# Patient Record
Sex: FEMALE | Employment: OTHER
[De-identification: names, ages, dates, MRNs, and addresses within clinical notes are randomized per-mention and may not be internally consistent; named-entity substitution may affect disease eponyms.]

---

## 2020-12-16 DIAGNOSIS — K02.9 CARIES: Primary | ICD-10-CM

## 2023-05-15 ENCOUNTER — TRANSCRIBE ORDERS (OUTPATIENT)
Dept: OTHER | Age: 68
End: 2023-05-15

## 2023-05-15 DIAGNOSIS — N32.89 OTHER SPECIFIED DISORDERS OF BLADDER: Primary | ICD-10-CM

## 2023-06-01 ENCOUNTER — OFFICE VISIT (OUTPATIENT)
Dept: UROLOGY | Facility: CLINIC | Age: 68
End: 2023-06-01
Payer: COMMERCIAL

## 2023-06-01 VITALS
HEART RATE: 87 BPM | WEIGHT: 177 LBS | BODY MASS INDEX: 27.78 KG/M2 | DIASTOLIC BLOOD PRESSURE: 85 MMHG | HEIGHT: 67 IN | SYSTOLIC BLOOD PRESSURE: 115 MMHG | OXYGEN SATURATION: 96 %

## 2023-06-01 DIAGNOSIS — C67.8 MALIGNANT NEOPLASM OF OVERLAPPING SITES OF BLADDER (H): Primary | ICD-10-CM

## 2023-06-01 DIAGNOSIS — N32.89 OTHER SPECIFIED DISORDERS OF BLADDER: ICD-10-CM

## 2023-06-01 DIAGNOSIS — N13.30 HYDRONEPHROSIS OF LEFT KIDNEY: ICD-10-CM

## 2023-06-01 PROCEDURE — 99204 OFFICE O/P NEW MOD 45 MIN: CPT | Performed by: UROLOGY

## 2023-06-01 RX ORDER — GABAPENTIN 600 MG/1
300 TABLET ORAL 3 TIMES DAILY
COMMUNITY

## 2023-06-01 RX ORDER — HYDROXYZINE PAMOATE 25 MG/1
CAPSULE ORAL
COMMUNITY

## 2023-06-01 RX ORDER — SIMVASTATIN 20 MG
TABLET ORAL
COMMUNITY

## 2023-06-01 RX ORDER — HYDROCODONE BITARTRATE AND ACETAMINOPHEN 5; 325 MG/1; MG/1
1 TABLET ORAL EVERY 6 HOURS PRN
COMMUNITY
End: 2023-07-10

## 2023-06-01 RX ORDER — HYDROCODONE BITARTRATE AND ACETAMINOPHEN 5; 325 MG/1; MG/1
1 TABLET ORAL EVERY 6 HOURS PRN
Qty: 5 TABLET | Refills: 0 | Status: SHIPPED | OUTPATIENT
Start: 2023-06-01 | End: 2023-06-26

## 2023-06-01 ASSESSMENT — PAIN SCALES - GENERAL: PAINLEVEL: MODERATE PAIN (4)

## 2023-06-01 NOTE — LETTER
6/1/2023       RE: Abiel Ruiz  [address]  [facility] [address]     Dear Colleague,    Thank you for referring your patient, Abiel Ruiz, to the [facility] [place] at [facility]. Please see a copy of my visit note below.          Chief Complaint:   Bladder Cancer         Consult or Referral:     Abiel Ruiz is a 68 year old female seen at the request of Dr. Suazo.         History of Present Illness:    Abeil Ruiz is a very pleasant 68 year old female who presents with a history of muscle-invasive bladder cancer. She recently experienced gross hematuria and was found on workup to have left-sided bladder mass and left hydronephrosis. TURBT completed by Dr. Suazo was notable for invasive high grade urothelial carcinoma with muscularis propria invasion. She presents for further evaluation. Her bladder tumor was noted to be at the trigone, involving the left ureteral orifice, which was not identified during procedure.  She has done well since procedure with less hematuria, but continues to have left flank pain, likely related to obstruction. She was advised to have left PNT placed, but has declined to this point.     Creat 1.3     CT abd/pelvis 5/2/2023  IMPRESSION:   1. High-grade obstruction of the distal left ureter with limited visualization because of a right total hip arthroplasty and associated streak artifact. Questionable fat density in the distal left ureter. No definite renal, ureteral or bladder calculi. I would recommend urologic consultation. Cystoscopy may be appropriate.          Past Medical History:     Past Medical History:   Diagnosis Date    Gout     Spider veins    HTN         Past Surgical History:     Past Surgical History:   Procedure Laterality Date    BLADDER SURGERY N/A 05/10/2023    removal mass of urinary bladder   Hip replacement  Knee replacement  Remote  history of pneumothorax         Medications     Current Outpatient Medications   Medication    diphenhydrAMINE-acetaminophen (TYLENOL PM)  MG tablet    gabapentin (NEURONTIN) 600 MG tablet    HYDROcodone-acetaminophen (NORCO) 5-325 MG tablet    hydrOXYzine (VISTARIL) 25 MG capsule    simvastatin (ZOCOR) 20 MG tablet     No current facility-administered medications for this visit.          Family History:   History reviewed. No pertinent family history.     No known family history of  malignancy.         Social History:     Social History     Socioeconomic History    Marital status: Single     Spouse name: Not on file    Number of children: Not on file    Years of education: Not on file    Highest education level: Not on file   Occupational History    Not on file   Tobacco Use    Smoking status: Not on file    Smokeless tobacco: Not on file   Vaping Use    Vaping status: Not on file   Substance and Sexual Activity    Alcohol use: Not on file    Drug use: Not on file    Sexual activity: Not on file   Other Topics Concern    Not on file   Social History Narrative    Not on file     Social Determinants of Health     Financial Resource Strain: Not on file   Food Insecurity: Not on file   Transportation Needs: Not on file   Physical Activity: Not on file   Stress: Not on file   Social Connections: Not on file   Intimate Partner Violence: Not on file   Housing Stability: Not on file            Allergies:   Patient has no known allergies.         Review of Systems:  From intake questionnaire     Skin: negative  Eyes: negative  Ears/Nose/Throat: negative  Respiratory: No shortness of breath, dyspnea on exertion, cough, or hemoptysis  Cardiovascular: No chest pain or palpitations  Gastrointestinal: negative; no nausea/vomiting, constipation or diarrhea  Genitourinary: as per HPI  Musculoskeletal: negative  Neurologic: negative  Psychiatric: negative  Hematologic/Lymphatic/Immunologic: negative  Endocrine: negative      "    Physical Exam:     Patient is a 68 year old  female   Vitals: Blood pressure 115/85, pulse 87, height 1.702 m (5' 7\"), weight 80.3 kg (177 lb), SpO2 96 %.  Constitutional: Body mass index is 27.72 kg/m .  Alert, no acute distress, oriented, conversant  Eyes: no scleral icterus; extraocular muscles intact, moist conjunctivae  Respiratory: no respiratory distress, or pursed lip breathing  Cardiovascular: pulses strong and intact; no obvious jugular venous distension present  Gastrointestinal: soft, nontender, no organomegaly or masses,   Musculoskeletal: extremities normal, no peripheral edema  Skin: no suspicious lesions or rashes  Neuro: Alert, oriented, speech and mentation normal  Psych: affect and mood normal, alert and oriented to person, place and time      Imaging:    The following imaging exams were independently viewed and interpreted by me and discussed with patient:  CT with left hydronephrosis and bladder mass      Outside and Past Medical records:    Review of prior external note(s) from - Outside records from [facility]. Care Everywhere from [facility]  Review of the result(s) of each unique test - pathology, CT, creat         Assessment and Plan:     Assessment: 68 year old female with history of new diagnosis of muscle-invasive bladder cancer. She also has left flank pain and left hydronephrosis from obstructing tumor. We had a long discussion about muscleinvasive bladder cancer.  We discussed the fact that once urothelial cancer invades the bladder muscle layer it carries increased potential to invade the blood vessels and lymphatic channels and spread throughout the body.  Once urothelial cancer spreads to distant organs, the opportunity for complete response to treatment is rare. Thus, optimal cancer control is best achieved with aggressive surgical intervention.     We discussed radical cystectomy and the potential complications associated with it, including a 2-3% perioperative mortality " risk, and the risk of complications is about 60%-70%. Potential complications include, but are not limited to MI, stroke, DVT/PE, bleeding, infection, injury to surrounding structures, urine leak, and various urinary diversion-related complications. We also discussed that about 20-30% of patients will need to go to a rehab facility after discharge from the hospital.  Though most patients get through this operation, there is often a significant delay in regaining appetite and sometimes takes several months to feel back to normal.    We also briefly discussed the use of radiation combined with chemotherapy and radical TURBT, or tri-modal therapy. While this is an option in certain circumstances, it is less commonly used due to higher risk of recurrence and limitations on the clinical circumstances where this is an appropriate treatment choice.    We discussed the various types of urinary diversion including ileal conduit, [place] Pouch and ileal neobladder.  The various pros and cons of each type of urinary diversion was discussed and all questions were answered.  Prospective quality of life data is lacking that compares the various diversion types, but retrospective data suggest the differences are small by diversion type.    We discussed that while a neobladder allows voiding per urethra after surgery, it is a more complicated procedure and requires more significant maintenance following surgery. We discussed the possible functional limitations of this diversion, including risk of urinary incontinence, particularly at night-time, and the risk of urinary retention. We discussed that some patients need to perform intermittent catheterization indefinitely and may require bladder irrigations to prevent mucous and infections.    We discussed the use of neoadjuvant chemotherapy in the setting of muscle invasive bladder cancer and that the combined analyses of these studies indicate an absolute survival advantage. In this  particular circumstance, we discussed that the gold standard approach is neoadjuvant chemotherapy followed by radical cystectomy. A medical oncology referral was placed today to discuss this further.    The patient had many questions and we went over these carefully.     At this point, she is undecided about how she would like to proceed. She is hesitant about the idea of cystectomy. Furthermore, she is reticent to consider PNT. At this point I recommend evaluation with medical oncology to discuss the notion of neoadjuvant chemotherapy. It is also reasonable to discuss chemoradiation, however given location of tumor and obstruction of left kidney, this will likely create long-standing renal obstruction. For now, I have also recommended PNT to decompress left kidney, but she wants to meet with oncology first.    Plan:  Norco for ongoing bladder and flank pain  Recommend left PNT  Medical oncology referral    Orders  Orders Placed This Encounter   Procedures    Adult Oncology/Hematology  Referral     50 total minutes spent on the date of the encounter including direct interaction with the patient, performing chart review, documentation and further activities as noted above.    Reginald Rosales MD  Urology  [facility]

## 2023-06-01 NOTE — PROGRESS NOTES
Chief Complaint:   Bladder Cancer         Consult or Referral:     Abiel Ruiz is a 68 year old female seen at the request of Dr. Suazo.         History of Present Illness:    Abiel Ruiz is a very pleasant 68 year old female who presents with a history of muscle-invasive bladder cancer. She recently experienced gross hematuria and was found on workup to have left-sided bladder mass and left hydronephrosis. TURBT completed by Dr. Suazo was notable for invasive high grade urothelial carcinoma with muscularis propria invasion. She presents for further evaluation. Her bladder tumor was noted to be at the trigone, involving the left ureteral orifice, which was not identified during procedure.  She has done well since procedure with less hematuria, but continues to have left flank pain, likely related to obstruction. She was advised to have left PNT placed, but has declined to this point.     Creat 1.3     CT abd/pelvis 5/2/2023  IMPRESSION:   1. High-grade obstruction of the distal left ureter with limited visualization because of a right total hip arthroplasty and associated streak artifact. Questionable fat density in the distal left ureter. No definite renal, ureteral or bladder calculi. I would recommend urologic consultation. Cystoscopy may be appropriate.          Past Medical History:     Past Medical History:   Diagnosis Date     Gout      Spider veins    HTN         Past Surgical History:     Past Surgical History:   Procedure Laterality Date     BLADDER SURGERY N/A 05/10/2023    removal mass of urinary bladder   Hip replacement  Knee replacement  Remote history of pneumothorax         Medications     Current Outpatient Medications   Medication     diphenhydrAMINE-acetaminophen (TYLENOL PM)  MG tablet     gabapentin (NEURONTIN) 600 MG tablet     HYDROcodone-acetaminophen (NORCO) 5-325 MG tablet     hydrOXYzine (VISTARIL) 25 MG capsule     simvastatin (ZOCOR) 20 MG  "tablet     No current facility-administered medications for this visit.          Family History:   History reviewed. No pertinent family history.     No known family history of  malignancy.         Social History:     Social History     Socioeconomic History     Marital status: Single     Spouse name: Not on file     Number of children: Not on file     Years of education: Not on file     Highest education level: Not on file   Occupational History     Not on file   Tobacco Use     Smoking status: Not on file     Smokeless tobacco: Not on file   Vaping Use     Vaping status: Not on file   Substance and Sexual Activity     Alcohol use: Not on file     Drug use: Not on file     Sexual activity: Not on file   Other Topics Concern     Not on file   Social History Narrative     Not on file     Social Determinants of Health     Financial Resource Strain: Not on file   Food Insecurity: Not on file   Transportation Needs: Not on file   Physical Activity: Not on file   Stress: Not on file   Social Connections: Not on file   Intimate Partner Violence: Not on file   Housing Stability: Not on file            Allergies:   Patient has no known allergies.         Review of Systems:  From intake questionnaire     Skin: negative  Eyes: negative  Ears/Nose/Throat: negative  Respiratory: No shortness of breath, dyspnea on exertion, cough, or hemoptysis  Cardiovascular: No chest pain or palpitations  Gastrointestinal: negative; no nausea/vomiting, constipation or diarrhea  Genitourinary: as per HPI  Musculoskeletal: negative  Neurologic: negative  Psychiatric: negative  Hematologic/Lymphatic/Immunologic: negative  Endocrine: negative         Physical Exam:     Patient is a 68 year old  female   Vitals: Blood pressure 115/85, pulse 87, height 1.702 m (5' 7\"), weight 80.3 kg (177 lb), SpO2 96 %.  Constitutional: Body mass index is 27.72 kg/m .  Alert, no acute distress, oriented, conversant  Eyes: no scleral icterus; extraocular muscles " intact, moist conjunctivae  Respiratory: no respiratory distress, or pursed lip breathing  Cardiovascular: pulses strong and intact; no obvious jugular venous distension present  Gastrointestinal: soft, nontender, no organomegaly or masses,   Musculoskeletal: extremities normal, no peripheral edema  Skin: no suspicious lesions or rashes  Neuro: Alert, oriented, speech and mentation normal  Psych: affect and mood normal, alert and oriented to person, place and time      Imaging:    The following imaging exams were independently viewed and interpreted by me and discussed with patient:  CT with left hydronephrosis and bladder mass      Outside and Past Medical records:    Review of prior external note(s) from - Outside records from [facility]. Care Everywhere from [facility]  Review of the result(s) of each unique test - pathology, CT, creat         Assessment and Plan:     Assessment: 68 year old female with history of new diagnosis of muscle-invasive bladder cancer. She also has left flank pain and left hydronephrosis from obstructing tumor. We had a long discussion about muscleinvasive bladder cancer.  We discussed the fact that once urothelial cancer invades the bladder muscle layer it carries increased potential to invade the blood vessels and lymphatic channels and spread throughout the body.  Once urothelial cancer spreads to distant organs, the opportunity for complete response to treatment is rare. Thus, optimal cancer control is best achieved with aggressive surgical intervention.     We discussed radical cystectomy and the potential complications associated with it, including a 2-3% perioperative mortality risk, and the risk of complications is about 60%-70%. Potential complications include, but are not limited to MI, stroke, DVT/PE, bleeding, infection, injury to surrounding structures, urine leak, and various urinary diversion-related complications. We also discussed that about 20-30% of patients will  need to go to a rehab facility after discharge from the hospital.  Though most patients get through this operation, there is often a significant delay in regaining appetite and sometimes takes several months to feel back to normal.    We also briefly discussed the use of radiation combined with chemotherapy and radical TURBT, or tri-modal therapy. While this is an option in certain circumstances, it is less commonly used due to higher risk of recurrence and limitations on the clinical circumstances where this is an appropriate treatment choice.    We discussed the various types of urinary diversion including ileal conduit, [place] Pouch and ileal neobladder.  The various pros and cons of each type of urinary diversion was discussed and all questions were answered.  Prospective quality of life data is lacking that compares the various diversion types, but retrospective data suggest the differences are small by diversion type.    We discussed that while a neobladder allows voiding per urethra after surgery, it is a more complicated procedure and requires more significant maintenance following surgery. We discussed the possible functional limitations of this diversion, including risk of urinary incontinence, particularly at night-time, and the risk of urinary retention. We discussed that some patients need to perform intermittent catheterization indefinitely and may require bladder irrigations to prevent mucous and infections.    We discussed the use of neoadjuvant chemotherapy in the setting of muscle invasive bladder cancer and that the combined analyses of these studies indicate an absolute survival advantage. In this particular circumstance, we discussed that the gold standard approach is neoadjuvant chemotherapy followed by radical cystectomy. A medical oncology referral was placed today to discuss this further.    The patient had many questions and we went over these carefully.     At this point, she is undecided  about how she would like to proceed. She is hesitant about the idea of cystectomy. Furthermore, she is reticent to consider PNT. At this point I recommend evaluation with medical oncology to discuss the notion of neoadjuvant chemotherapy. It is also reasonable to discuss chemoradiation, however given location of tumor and obstruction of left kidney, this will likely create long-standing renal obstruction. For now, I have also recommended PNT to decompress left kidney, but she wants to meet with oncology first.    Plan:  Norco for ongoing bladder and flank pain  Recommend left PNT  Medical oncology referral    Orders  Orders Placed This Encounter   Procedures     Adult Oncology/Hematology  Referral     50 total minutes spent on the date of the encounter including direct interaction with the patient, performing chart review, documentation and further activities as noted above.    Reginald Rosales MD  Urology  [facility]

## 2023-06-02 ENCOUNTER — PATIENT OUTREACH (OUTPATIENT)
Dept: ONCOLOGY | Facility: CLINIC | Age: 68
End: 2023-06-02
Payer: COMMERCIAL

## 2023-06-02 NOTE — PROGRESS NOTES
[facility]: Cancer Care                                                                   Hem/Onc  Referral reviewed     Referred By 06/01/2023 12:13 PM   Routine: Next available opening    Referred To   Reginald Rosales MD    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 Left voicemail introducing my role as nurse navigator with [facility] and that we have recd the referral to oncology from her urologist.Explained to pt that we are working on location date/time options for consult and she will receive a call from our scheduling intake team in the next 1-2 business day to review date/time/location options, provided our callback number below for interval questions.     PLAN                                                      Medical Oncology consult       See records team's Pre-Visit encounter documentation for additional records retrieval information.    Kaylen Landeros, RN, BSN, OCN  Hematology/Oncology New Patient Nurse Navigator    [facility]  [phone]  [phone]

## 2023-06-06 NOTE — TELEPHONE ENCOUNTER
RECORDS STATUS - ALL OTHER DIAGNOSIS    Malignant neoplasm of overlapping sites of bladder   RECORDS RECEIVED FROM: [facility]   DATE RECEIVED: 6/7/2023    Action    Action Taken 6/6/2023 5:15pm SALVADOR    I called [facility]'s IMG Dept Ph: [phone]- [phone]. I faxed over a request for scans.     I faxed over a request for path slides to [facility]     6/9/23 12:11 PM- Slides received from [facility] (2 Slides). Sent to fifth floor pathology to be reviewed.       NOTES STATUS DETAILS   OFFICE NOTE from referring provider     OFFICE NOTE from medical oncologist     DISCHARGE SUMMARY from hospital     DISCHARGE REPORT from the ER     OPERATIVE REPORT     MEDICATION LIST     CLINICAL TRIAL TREATMENTS TO DATE     LABS     PATHOLOGY REPORTS Requested- [facility]   [employer] Tracking Number: [id number] 5/10/2023   [facility]   Case: [id number]                                   Bladder, tumor, TUR - Invasive high grade urothelial carcinoma with muscularis propria invasion   ANYTHING RELATED TO DIAGNOSIS     GENONOMIC TESTING     TYPE:     IMAGING (NEED IMAGES & REPORT)     CT SCANS PACS 5/2/23, 6/9/22, 6/9/21, 5/11/21: [facility]   MRI     MAMMO     ULTRASOUND PACS 7/27/16: [facility]   PET

## 2023-06-07 ENCOUNTER — ANCILLARY PROCEDURE (OUTPATIENT)
Dept: ULTRASOUND IMAGING | Facility: CLINIC | Age: 68
End: 2023-06-07
Attending: STUDENT IN AN ORGANIZED HEALTH CARE EDUCATION/TRAINING PROGRAM
Payer: COMMERCIAL

## 2023-06-07 ENCOUNTER — PATIENT OUTREACH (OUTPATIENT)
Dept: ONCOLOGY | Facility: CLINIC | Age: 68
End: 2023-06-07

## 2023-06-07 ENCOUNTER — ONCOLOGY VISIT (OUTPATIENT)
Dept: ONCOLOGY | Facility: CLINIC | Age: 68
End: 2023-06-07
Attending: UROLOGY
Payer: COMMERCIAL

## 2023-06-07 ENCOUNTER — PRE VISIT (OUTPATIENT)
Dept: ONCOLOGY | Facility: CLINIC | Age: 68
End: 2023-06-07
Payer: COMMERCIAL

## 2023-06-07 VITALS
HEIGHT: 67 IN | SYSTOLIC BLOOD PRESSURE: 127 MMHG | RESPIRATION RATE: 16 BRPM | OXYGEN SATURATION: 100 % | HEART RATE: 77 BPM | BODY MASS INDEX: 27.83 KG/M2 | DIASTOLIC BLOOD PRESSURE: 75 MMHG | WEIGHT: 177.3 LBS | TEMPERATURE: 97.9 F

## 2023-06-07 DIAGNOSIS — C67.8 MALIGNANT NEOPLASM OF OVERLAPPING SITES OF BLADDER (H): Primary | ICD-10-CM

## 2023-06-07 DIAGNOSIS — C67.8 MALIGNANT NEOPLASM OF OVERLAPPING SITES OF BLADDER (H): ICD-10-CM

## 2023-06-07 LAB
ALBUMIN SERPL BCG-MCNC: 4.1 G/DL (ref 3.5–5.2)
ALP SERPL-CCNC: 71 U/L (ref 35–104)
ALT SERPL W P-5'-P-CCNC: 8 U/L (ref 10–35)
ANION GAP SERPL CALCULATED.3IONS-SCNC: 10 MMOL/L (ref 7–15)
AST SERPL W P-5'-P-CCNC: 19 U/L (ref 10–35)
BASOPHILS # BLD AUTO: 0.1 10E3/UL (ref 0–0.2)
BASOPHILS NFR BLD AUTO: 1 %
BILIRUB SERPL-MCNC: 0.4 MG/DL
BUN SERPL-MCNC: 7.9 MG/DL (ref 8–23)
CALCIUM SERPL-MCNC: 9.8 MG/DL (ref 8.8–10.2)
CHLORIDE SERPL-SCNC: 104 MMOL/L (ref 98–107)
CREAT SERPL-MCNC: 1.15 MG/DL (ref 0.51–0.95)
DEPRECATED HCO3 PLAS-SCNC: 25 MMOL/L (ref 22–29)
EOSINOPHIL # BLD AUTO: 0.1 10E3/UL (ref 0–0.7)
EOSINOPHIL NFR BLD AUTO: 2 %
ERYTHROCYTE [DISTWIDTH] IN BLOOD BY AUTOMATED COUNT: 12.9 % (ref 10–15)
GFR SERPL CREATININE-BSD FRML MDRD: 51 ML/MIN/1.73M2
GLUCOSE SERPL-MCNC: 97 MG/DL (ref 70–99)
HCT VFR BLD AUTO: 37.9 % (ref 35–47)
HGB BLD-MCNC: 12.4 G/DL (ref 11.7–15.7)
IMM GRANULOCYTES # BLD: 0 10E3/UL
IMM GRANULOCYTES NFR BLD: 0 %
LYMPHOCYTES # BLD AUTO: 4.2 10E3/UL (ref 0.8–5.3)
LYMPHOCYTES NFR BLD AUTO: 48 %
MAGNESIUM SERPL-MCNC: 1.9 MG/DL (ref 1.7–2.3)
MCH RBC QN AUTO: 30.8 PG (ref 26.5–33)
MCHC RBC AUTO-ENTMCNC: 32.7 G/DL (ref 31.5–36.5)
MCV RBC AUTO: 94 FL (ref 78–100)
MONOCYTES # BLD AUTO: 0.6 10E3/UL (ref 0–1.3)
MONOCYTES NFR BLD AUTO: 6 %
NEUTROPHILS # BLD AUTO: 3.8 10E3/UL (ref 1.6–8.3)
NEUTROPHILS NFR BLD AUTO: 43 %
NRBC # BLD AUTO: 0 10E3/UL
NRBC BLD AUTO-RTO: 0 /100
PHOSPHATE SERPL-MCNC: 3.2 MG/DL (ref 2.5–4.5)
PLATELET # BLD AUTO: 323 10E3/UL (ref 150–450)
POTASSIUM SERPL-SCNC: 4 MMOL/L (ref 3.4–5.3)
PROT SERPL-MCNC: 7.8 G/DL (ref 6.4–8.3)
RBC # BLD AUTO: 4.02 10E6/UL (ref 3.8–5.2)
SODIUM SERPL-SCNC: 139 MMOL/L (ref 136–145)
URATE SERPL-MCNC: 7.9 MG/DL (ref 2.4–5.7)
WBC # BLD AUTO: 8.8 10E3/UL (ref 4–11)

## 2023-06-07 PROCEDURE — 36415 COLL VENOUS BLD VENIPUNCTURE: CPT | Performed by: STUDENT IN AN ORGANIZED HEALTH CARE EDUCATION/TRAINING PROGRAM

## 2023-06-07 PROCEDURE — 83735 ASSAY OF MAGNESIUM: CPT | Performed by: STUDENT IN AN ORGANIZED HEALTH CARE EDUCATION/TRAINING PROGRAM

## 2023-06-07 PROCEDURE — 82374 ASSAY BLOOD CARBON DIOXIDE: CPT | Performed by: STUDENT IN AN ORGANIZED HEALTH CARE EDUCATION/TRAINING PROGRAM

## 2023-06-07 PROCEDURE — 84100 ASSAY OF PHOSPHORUS: CPT | Performed by: STUDENT IN AN ORGANIZED HEALTH CARE EDUCATION/TRAINING PROGRAM

## 2023-06-07 PROCEDURE — 84550 ASSAY OF BLOOD/URIC ACID: CPT | Performed by: STUDENT IN AN ORGANIZED HEALTH CARE EDUCATION/TRAINING PROGRAM

## 2023-06-07 PROCEDURE — 85025 COMPLETE CBC W/AUTO DIFF WBC: CPT | Performed by: STUDENT IN AN ORGANIZED HEALTH CARE EDUCATION/TRAINING PROGRAM

## 2023-06-07 PROCEDURE — 99215 OFFICE O/P EST HI 40 MIN: CPT | Performed by: STUDENT IN AN ORGANIZED HEALTH CARE EDUCATION/TRAINING PROGRAM

## 2023-06-07 PROCEDURE — G0463 HOSPITAL OUTPT CLINIC VISIT: HCPCS | Performed by: STUDENT IN AN ORGANIZED HEALTH CARE EDUCATION/TRAINING PROGRAM

## 2023-06-07 PROCEDURE — 76770 US EXAM ABDO BACK WALL COMP: CPT | Mod: GC | Performed by: RADIOLOGY

## 2023-06-07 PROCEDURE — 99417 PROLNG OP E/M EACH 15 MIN: CPT | Performed by: STUDENT IN AN ORGANIZED HEALTH CARE EDUCATION/TRAINING PROGRAM

## 2023-06-07 PROCEDURE — 82310 ASSAY OF CALCIUM: CPT | Performed by: STUDENT IN AN ORGANIZED HEALTH CARE EDUCATION/TRAINING PROGRAM

## 2023-06-07 RX ORDER — DOCUSATE SODIUM 100 MG/1
100 CAPSULE, LIQUID FILLED ORAL 2 TIMES DAILY
COMMUNITY
Start: 2023-05-10 | End: 2023-06-26

## 2023-06-07 RX ORDER — HYDROCODONE BITARTRATE AND ACETAMINOPHEN 5; 325 MG/1; MG/1
1 TABLET ORAL EVERY 6 HOURS PRN
Qty: 56 TABLET | Refills: 0 | Status: SHIPPED | OUTPATIENT
Start: 2023-06-07 | End: 2023-06-21

## 2023-06-07 RX ORDER — COLCHICINE 0.6 MG/1
TABLET ORAL
COMMUNITY

## 2023-06-07 ASSESSMENT — PAIN SCALES - GENERAL: PAINLEVEL: NO PAIN (0)

## 2023-06-07 NOTE — LETTER
6/7/2023         RE: Abiel Ruiz  [address]  [facility] [address]        Dear Colleague,    Thank you for referring your patient, Abiel Ruiz, to the [facility]. Please see a copy of my visit note below.      [facility] Medical Oncology Second Opinion Consultation Note       Date of visit: June 7, 2023    Dear Dr. Reginald Rosales, thank you for referring your patient for a Second Opinion consultation at the [facility].  A brief overview of his oncological history as well as my recommendations follow below.    CC: MIBC, consideration of neoadjuvant chemotherapy.      HPI: Abiel presents today for consideration of neoadjuvant chemotherapy for UC.  She initially had blood in urine with a blood clot in February.  She had pelvic/perineal pain also and underwent US with evidence of myometrial mass consistent with fibroid.  She was seen by Urology 4/19/23 by Dr. Hancock of [facility].  She knows she has bladder cancer and did not have PNT placed in May (5/15) as originally suggested.  She wasn't sure why it was recommended and was scared of the procedure also.  Today she has significant flank pain on the L side.  She has not had additional blood in her urine since.  She has been taking 3 to 4 five mg hydrocodone acetaminophen daily and ran out about a week ago.  She has been calling Dr. Hancock's office but has not gotten a reply about refill.  She is here alone and is pretty anxious about the diagnosis and isn't sure what to do or whether she wants to have her bladder removed.  She would like to talk today, work on the pain, and then discuss the recommendations with her family members in [place] before making a decision. At baseline she has neuropathy in her feet and some in her hands.  She has been taking gabapentin 600mg three times daily for years, but isn't sure why she has the neuropathy.      ONCOLOGY HISTORY:  2/2023-Initial hematuria with clot.    2/17/2023-Pelvic US with note of fibroid.   4/21/2023-Initial urology appt with Dr. Hancock   5/2/2023-CT urogram with note of L sided hydronephrosis and fat density at [facility]. Note of prostate in formal read, which was later edited.  This may be concerning for metastatic disease.    5/10/2023-Cystoscopy with TURBT-L ureteral orifice not visible.  Op report notes dense, non-papillary mass involving majority of the trigone to the neck of the bladder.  Some of the tumor was reported to be removed, but it sounds like significant tumor remained post resection due to the technical difficulty.  Tumor up to 5cm There was a note in the report that there may be a mass (also thought by Dr. Hancock to be metastatic disease).  L PNT was recommended.  Pathology notable for invasive, high grade urothelial carcinoma with muscle invasion. LVI not present.  Block A2 recommended for testing.   6/1/23-Referral to Dr. Rosales at [facility] for consideration of cystectomy.  Recommendation made for med onc for neoadjuvant therapy consideration and for PNT placement again.   6/7/23-Initial med onc appointment.  Will proceed with staging using PET CT and recommend PNT placement.  IR referral made for L PNT.  Repeat renal US with L hydronephrosis.      GENOMIC STUDIES: None currently.  Recommend Caris testing of bladder tumor. Block A2 preferred.     TREATMENT HISTORY:   TURBT 5/10/23    GUIDELINES USED: Pending staging.     INTENT OF THERAPY: Pending staging.     CLINICAL TRIALS:  None    PMH:  Neuropathy, idiopathic   Anxiety   Essential HTN  Chronic gout  History of tobacco abuse/dependence.  HLD  COPD   Endometriosis  Sleep apnea  DJD  Chronic pain     PSH:  TURBT  TKA, R 1/2013  R Total hip arthroplasty 2/16/16  Endometriosis surgery x2    FAMILY HX: denies family history of  malignancies    SOCIAL:  Current smoker. 1/2 PPD x39 years  No smokeless tobacco use  No EtoH   No  "recreational drugs  No herbs/supplements not on medication list already.     ALLERGIES:     MEDS:  Current Outpatient Medications   Medication Instructions    diphenhydrAMINE-acetaminophen (TYLENOL PM)  MG tablet 1 tablet, Oral, AT BEDTIME PRN    gabapentin (NEURONTIN) 600 mg, Oral, 3 TIMES DAILY    HYDROcodone-acetaminophen (NORCO) 5-325 MG tablet 1 tablet, Oral, EVERY 6 HOURS PRN    HYDROcodone-acetaminophen (NORCO) 5-325 MG tablet 1 tablet, Oral, EVERY 6 HOURS PRN    hydrOXYzine (VISTARIL) 25 MG capsule hydroxyzine pamoate 25 mg capsule   TAKE 1 CAP BY MOUTH THREE TIMES DAILY AS NEEDED. CAN TAKE 2 CAPS NIGHTLY AS NEEDED FOR SLEEP    simvastatin (ZOCOR) 20 MG tablet simvastatin 20 mg tablet   TAKE 1 TABLET BY MOUTH ONCE A DAY FOR CHOLESTEROL     ROS-Remainder of 14 point ROS reviewed and negative except as in HPI.    PHYSICAL EXAM:  ECOG-PS=1    /75   Pulse 77   Temp 97.9  F (36.6  C) (Oral)   Resp 16   Ht 1.702 m (5' 7\")   Wt 80.4 kg (177 lb 4.8 oz)   SpO2 100%   BMI 27.77 kg/m    Exam:  Constitutional: healthy, alert and no distress  Head: Normocephalic. No masses or lesions.   Neck: Neck supple. No adenopathy grossly.   ENT: ENT exam normal, no neck nodes or sinus tenderness  Cardiovascular: negative, No edema or JVD.  Respiratory: negative, normal WOB on RA, no wheezing or rhonchi   Gastrointestinal: Abdomen soft, mild tenderness, flank tenderness present on L.   : Deferred  Musculoskeletal: extremities normal- no gross deformities noted and normal muscle tone  Skin: no suspicious lesions or rashes on exposed areas of skin   Neurologic: CNII-XII grossly intact, normal speech and mentation.   Psychiatric: mentation appears normal, anxious and worried  Hematologic/Lymphatic/Immunologic: no grossly enlarged cervical LN.     LABS AND IMAGES:    CT urogram from [facility]. Personally reviewed. Clear L sided hydronephrosis.  No overt LAD.  Bladder is obscured by hip prosthesis artifact, " although there does appear to be a deep pelvic mass that I cannot identify.      Renal US at [facility] 6/7/23. Notable for L sided hydronephrosis.     No prior baseline creatinine available.    Labs from 6/7/23 with creatinine of 1.15 and GFR of 51mL/minute.  CBC WNL.  LFTs WNL.  Uric acid mildly elevated to 7.9.      IMPRESSION AND PLAN:    # muscle-invasive urothelial carcinoma, pending staging.   Abiel presents for initial medical oncology consultation and possible consideration of neoadjuvant chemotherapy for muscle invasive urothelial carcinoma.  She initially had hematuria with clot in February 2023 but it does not seem like she had been evaluated until at least April 2023.  She underwent cystoscopy with TURBT at [facility] 5/10/2023 with note of a significant tumor in the bladder trigone to the bladder neck which sounds like it was unable to be completely removed due to size location and invasiveness.  This was at least 5 cm per the report and pathology was notable for muscle invasive urothelial carcinoma.  CT urogram prior to this visit was notable for a possible mass blocking the left ureterovesical junction, which was also noted to not be visible on cystoscopy and precluded ureteral stent placement.  Notably there was an additional mass potentially posterior to the bladder which was initially interpreted as the prostate by the reading radiologist, but this appears to be another mass.  The etiology of this mass is unclear as she also has a history of endometriosis.  Unfortunately much of the pelvis is obscured by her right hip prosthesis on the CT study.  She saw Dr. Adler on 6/1/2023 for consideration of a cystectomy.    At her initial appointment on 6/7/2023, we discussed the basis for cancer treatment and the distinction between curative and palliative intent.  Unfortunately we do not have all the imaging and staging information we need to have that full discussion.  We discussed the basics of  urothelial carcinoma including the differences between muscle invasive and nonmuscle invasive and the rationale for cystectomy if this is isolated to the bladder itself and muscle invasive.  We discussed the approximate 10% benefit of neoadjuvant chemotherapy use prior to cystectomy and the fact that this would be curative intent.  Unfortunately I also do have concerns for possible metastatic versus locally advanced disease based on the indeterminate mass from the CT urogram.  I am recommending a PET CT for further evaluation and staging in order to determine eligibility for cystectomy.  I discussed that this requires at least 2 weeks for insurance approval and I have requested this to be scheduled exactly 2 weeks out in order to to get the procedure and the read as fast as possible.    Unfortunately her GFR prior to our appointment was only 44 mL/min, and we extensively discussed the need to improve renal function for consideration of cisplatin or carboplatin based chemotherapies.  Unfortunately there does not appear to be recent creatinine for baseline, but I believe this is most likely due to the left hydronephrosis due to the bladder mass obscuring the left ureterovesicular junction.  I discussed the importance of repeating imaging with an ultrasound at our initial appointment to evaluate the status of hydronephrosis, which was done immediately after appointment.  Unfortunately there was still left-sided hydronephrosis present and her creatinine was 1.15 with a GFR 51.  I did discuss my recommendation for possible hospitalization prior to the return of labs due to my concern for possible urgent need for PNT placement, however given the creatinine of just 1.15 I believe this can be done as an outpatient.      She did have significant left-sided flank pain which is previously treated with Norco from Dr. Hancock, however she has run out and not been able to get a refill.  Given the extent of her hydronephrosis I  believe she is a clear reason for pain and we discussed that I would be willing to help manage pain from his hydronephrosis until she is able to get PNT placed.  I emphasized the need for an intervention to relieve this pain (PNT placement) otherwise we are just masking it with medications and will be unlikely to ever resolve spontaneously.  I made referral to interventional radiology for placement of left-sided PNT, which was approved.  She was very hesitant and notes she did not show up for her initial PNT appointment on 5/15/2023 at [facility].  I note that Dr. Rosales also recommended placement of a left-sided PNT to relieve the hydronephrosis.  I did have her stay at least until the creatinine resolved to determine whether she needed to be admitted to the hospital for urgent PNT placement, but given her creatinine of only 1.15, I do not believe she she required hospital admission (she was also uncertain whether she would consider being admitted to the hospital regardless of recommendation or not).  She remains very hesitant about this and I called her on the evening of 6/9/2023 to discuss the ultrasound results and provide additional counseling.  I again reemphasized the need for PNT placement to relieve the hydronephrosis as there does not appear to be another option given the lack of visibility of the ureterovesicular junction to place a stent.  I discussed that the pain would not resolve on his own and improvement in kidney function would be an important part of making her eligible for the best possible treatment.  Without resolution of this any treatment would potentially be subpar due to the inability to use cisplatin.  She would discuss with family members and then make a decision, although again I recommended she have the PNT placement.    Finally I discussed that I would be willing to fill a temporary prescription of pain medication which she said Norco 5 mg was effective.  I discussed that I would  not be a long-term pain management provider for her and that I do not take over any other controlled substance prescriptions that already existing.  Again I do believe she has a real reason for pain from the hydronephrosis but I again emphasized that this must be intervened on to try to relieve the pain rather than just mask with narcotics.  She does have a note of clonazepam in her chart which she takes for sleep per her report.  She does not take this every night and I did recommend that she avoid taking this when she takes the Norco for the pain due to the additive effects.  I also provided a prescription for Narcan which is to be filled at the same time.  She also has significant neuropathy and is taking gabapentin and I also emphasized the additive effects of gabapentin and Norco as well and that she should be careful and take no more than one 5 mg Norco pill every 6 hours.    I will see her back on 6/26/2023 after PET scan has been completed and we have a further discussion about staging of her cancer and the possible treatment options.  I have already reserved infusion appointments for gemcitabine and cisplatin should she be eligible for this therapy although these infusions could be changed to what ever therapy is necessary.    PLAN:   Please get labs and an ultrasound today.  I am very worried about your kidney function.   I will call with the results and what needs to be done next.  You may need to be admitted to the hospital to get a tube placed into your kidney to relieve the pressure.   I will send a prescription for pain medication to your pharmacy once I see your kidney function.   I am sending you home with narcan as well in case you get too sleepy from your pain medications.   Please do not use the Klonopin/clonazopam if you are not taking every day.  This will make you much more sleepy with the gabapentin and pain medication.   Get a PET scan in 2 weeks.  I cannot order sooner due to insurance  restrictions.   See me back on 6/26 at 10:00.      A total of 120 minutes were spent on this patient on the day of the encounter, of which more than 50% of this time was used for counseling and coordination of care.  The patient was given the opportunity to ask multiple questions today, all of which were answered to their satisfaction.  Extended service time on the day of the encounter for follow-up in person regarding labs plans and discussion with interventional radiology regarding PNT placement, and obtaining records which had not been sent prior to appointment.    Tk Kirkland MD, PhD   of Medicine   Oncology/BMT/Cellular Therapies

## 2023-06-07 NOTE — CONSULTS
This is a 68 year old female with a PMH of chronic gout, HTN, right total hip replacement, new dx urothelial carcinoma with obstruction of L ureter, s/p TURBT at [facility], ureteral stent not able to be placed ?.  Ongoing significant pain due to hydronephrosis.     CT 5/2/23 FINDINGS: Significant bronchiectasis is present at the lung bases. Delayed nephrogram on the left. There is significant left-sided hydronephrosis and hydroureter extending down to the ureterovesicular junction. Streak artifact is present through the pelvis because of a right-sided total hip arthroplasty making visualization somewhat difficult, however, there is the suggestion of fat density within the distal left ureter extending to the ureteral orifice. No definite renal, ureteral or bladder calculi. There is likely enlargement of the prostate. Small right-sided renal cysts. No concerning renal parenchymal lesions are gallbladder is absent. Liver, spleen, adrenals and pancreas are normal in appearance. No dilated small bowel. Negative appendix.    IR has been asked to place a left PNT.     Recent imaging: US 6/7  Images in pacs Read incomplete.        CT 5/2/23 OSH IMPRESSION:   1. High-grade obstruction of the distal left ureter with limited visualization because of a right total hip arthroplasty and associated streak artifact. Questionable fat density in the distal left ureter. No definite renal, ureteral or bladder calculi. I would recommend urologic consultation. Cystoscopy may be appropriate.     Case and images CT5/2/23 and US 6/7/23 reviewed with Dr. Pyle from IR who approves a Left PNT placement.     Procedure entered.     Referring team Dr. [facility] has been notified of IR recommendations via Epic Messaging.     Zari Roland DNP APRN  Interventional Radiology

## 2023-06-07 NOTE — PROGRESS NOTES
[facility] Medical Oncology Second Opinion Consultation Note       Date of visit: June 7, 2023    Dear Dr. Reginald Rosales, thank you for referring your patient for a Second Opinion consultation at the [facility].  A brief overview of his oncological history as well as my recommendations follow below.    CC: MIBC, consideration of neoadjuvant chemotherapy.      HPI: Abiel presents today for consideration of neoadjuvant chemotherapy for UC.  She initially had blood in urine with a blood clot in February.  She had pelvic/perineal pain also and underwent US with evidence of myometrial mass consistent with fibroid.  She was seen by Urology 4/19/23 by Dr. Hancock of [facility].  She knows she has bladder cancer and did not have PNT placed in May (5/15) as originally suggested.  She wasn't sure why it was recommended and was scared of the procedure also.  Today she has significant flank pain on the L side.  She has not had additional blood in her urine since.  She has been taking 3 to 4 five mg hydrocodone acetaminophen daily and ran out about a week ago.  She has been calling Dr. Hancock's office but has not gotten a reply about refill.  She is here alone and is pretty anxious about the diagnosis and isn't sure what to do or whether she wants to have her bladder removed.  She would like to talk today, work on the pain, and then discuss the recommendations with her family members in [place] before making a decision. At baseline she has neuropathy in her feet and some in her hands.  She has been taking gabapentin 600mg three times daily for years, but isn't sure why she has the neuropathy.     ONCOLOGY HISTORY:  2/2023-Initial hematuria with clot.    2/17/2023-Pelvic US with note of fibroid.   4/21/2023-Initial urology appt with Dr. Hancock   5/2/2023-CT urogram with note of L sided hydronephrosis and fat density at [facility]. Note of prostate in formal read, which was  later edited.  This may be concerning for metastatic disease.    5/10/2023-Cystoscopy with TURBT-L ureteral orifice not visible.  Op report notes dense, non-papillary mass involving majority of the trigone to the neck of the bladder.  Some of the tumor was reported to be removed, but it sounds like significant tumor remained post resection due to the technical difficulty.  Tumor up to 5cm There was a note in the report that there may be a mass (also thought by Dr. Hancock to be metastatic disease).  L PNT was recommended.  Pathology notable for invasive, high grade urothelial carcinoma with muscle invasion. LVI not present.  Block A2 recommended for testing.   6/1/23-Referral to Dr. Rosales at [facility] for consideration of cystectomy.  Recommendation made for med onc for neoadjuvant therapy consideration and for PNT placement again.   6/7/23-Initial med onc appointment.  Will proceed with staging using PET CT and recommend PNT placement.  IR referral made for L PNT.  Repeat renal US with L hydronephrosis.      GENOMIC STUDIES: None currently.  Recommend Caris testing of bladder tumor. Block A2 preferred.     TREATMENT HISTORY:   TURBT 5/10/23    GUIDELINES USED: Pending staging.     INTENT OF THERAPY: Pending staging.     CLINICAL TRIALS:  None    PMH:  Neuropathy, idiopathic   Anxiety   Essential HTN  Chronic gout  History of tobacco abuse/dependence.  HLD  COPD   Endometriosis  Sleep apnea  DJD  Chronic pain     PSH:  TURBT  TKA, R 1/2013  R Total hip arthroplasty 2/16/16  Endometriosis surgery x2    FAMILY HX: denies family history of  malignancies    SOCIAL:  Current smoker. 1/2 PPD x39 years  No smokeless tobacco use  No EtoH   No recreational drugs  No herbs/supplements not on medication list already.     ALLERGIES:     MEDS:  Current Outpatient Medications   Medication Instructions     diphenhydrAMINE-acetaminophen (TYLENOL PM)  MG tablet 1 tablet, Oral, AT BEDTIME PRN     gabapentin (NEURONTIN) 600 mg,  "Oral, 3 TIMES DAILY     HYDROcodone-acetaminophen (NORCO) 5-325 MG tablet 1 tablet, Oral, EVERY 6 HOURS PRN     HYDROcodone-acetaminophen (NORCO) 5-325 MG tablet 1 tablet, Oral, EVERY 6 HOURS PRN     hydrOXYzine (VISTARIL) 25 MG capsule hydroxyzine pamoate 25 mg capsule   TAKE 1 CAP BY MOUTH THREE TIMES DAILY AS NEEDED. CAN TAKE 2 CAPS NIGHTLY AS NEEDED FOR SLEEP     simvastatin (ZOCOR) 20 MG tablet simvastatin 20 mg tablet   TAKE 1 TABLET BY MOUTH ONCE A DAY FOR CHOLESTEROL     ROS-Remainder of 14 point ROS reviewed and negative except as in HPI.    PHYSICAL EXAM:  ECOG-PS=1    /75   Pulse 77   Temp 97.9  F (36.6  C) (Oral)   Resp 16   Ht 1.702 m (5' 7\")   Wt 80.4 kg (177 lb 4.8 oz)   SpO2 100%   BMI 27.77 kg/m    Exam:  Constitutional: healthy, alert and no distress  Head: Normocephalic. No masses or lesions.   Neck: Neck supple. No adenopathy grossly.   ENT: ENT exam normal, no neck nodes or sinus tenderness  Cardiovascular: negative, No edema or JVD.  Respiratory: negative, normal WOB on RA, no wheezing or rhonchi   Gastrointestinal: Abdomen soft, mild tenderness, flank tenderness present on L.   : Deferred  Musculoskeletal: extremities normal- no gross deformities noted and normal muscle tone  Skin: no suspicious lesions or rashes on exposed areas of skin   Neurologic: CNII-XII grossly intact, normal speech and mentation.   Psychiatric: mentation appears normal, anxious and worried  Hematologic/Lymphatic/Immunologic: no grossly enlarged cervical LN.     LABS AND IMAGES:    CT urogram from [facility]. Personally reviewed. Clear L sided hydronephrosis.  No overt LAD.  Bladder is obscured by hip prosthesis artifact, although there does appear to be a deep pelvic mass that I cannot identify.      Renal US at [facility] 6/7/23. Notable for L sided hydronephrosis.     No prior baseline creatinine available.    Labs from 6/7/23 with creatinine of 1.15 and GFR of 51mL/minute.  CBC WNL.  LFTs WNL.  Uric acid " mildly elevated to 7.9.      IMPRESSION AND PLAN:    # muscle-invasive urothelial carcinoma, pending staging.   Abiel presents for initial medical oncology consultation and possible consideration of neoadjuvant chemotherapy for muscle invasive urothelial carcinoma.  She initially had hematuria with clot in February 2023 but it does not seem like she had been evaluated until at least April 2023.  She underwent cystoscopy with TURBT at [facility] 5/10/2023 with note of a significant tumor in the bladder trigone to the bladder neck which sounds like it was unable to be completely removed due to size location and invasiveness.  This was at least 5 cm per the report and pathology was notable for muscle invasive urothelial carcinoma.  CT urogram prior to this visit was notable for a possible mass blocking the left ureterovesical junction, which was also noted to not be visible on cystoscopy and precluded ureteral stent placement.  Notably there was an additional mass potentially posterior to the bladder which was initially interpreted as the prostate by the reading radiologist, but this appears to be another mass.  The etiology of this mass is unclear as she also has a history of endometriosis.  Unfortunately much of the pelvis is obscured by her right hip prosthesis on the CT study.  She saw Dr. Adler on 6/1/2023 for consideration of a cystectomy.    At her initial appointment on 6/7/2023, we discussed the basis for cancer treatment and the distinction between curative and palliative intent.  Unfortunately we do not have all the imaging and staging information we need to have that full discussion.  We discussed the basics of urothelial carcinoma including the differences between muscle invasive and nonmuscle invasive and the rationale for cystectomy if this is isolated to the bladder itself and muscle invasive.  We discussed the approximate 10% benefit of neoadjuvant chemotherapy use prior to cystectomy and  the fact that this would be curative intent.  Unfortunately I also do have concerns for possible metastatic versus locally advanced disease based on the indeterminate mass from the CT urogram.  I am recommending a PET CT for further evaluation and staging in order to determine eligibility for cystectomy.  I discussed that this requires at least 2 weeks for insurance approval and I have requested this to be scheduled exactly 2 weeks out in order to to get the procedure and the read as fast as possible.    Unfortunately her GFR prior to our appointment was only 44 mL/min, and we extensively discussed the need to improve renal function for consideration of cisplatin or carboplatin based chemotherapies.  Unfortunately there does not appear to be recent creatinine for baseline, but I believe this is most likely due to the left hydronephrosis due to the bladder mass obscuring the left ureterovesicular junction.  I discussed the importance of repeating imaging with an ultrasound at our initial appointment to evaluate the status of hydronephrosis, which was done immediately after appointment.  Unfortunately there was still left-sided hydronephrosis present and her creatinine was 1.15 with a GFR 51.  I did discuss my recommendation for possible hospitalization prior to the return of labs due to my concern for possible urgent need for PNT placement, however given the creatinine of just 1.15 I believe this can be done as an outpatient.      She did have significant left-sided flank pain which is previously treated with Norco from Dr. Hancock, however she has run out and not been able to get a refill.  Given the extent of her hydronephrosis I believe she is a clear reason for pain and we discussed that I would be willing to help manage pain from his hydronephrosis until she is able to get PNT placed.  I emphasized the need for an intervention to relieve this pain (PNT placement) otherwise we are just masking it with  medications and will be unlikely to ever resolve spontaneously.  I made referral to interventional radiology for placement of left-sided PNT, which was approved.  She was very hesitant and notes she did not show up for her initial PNT appointment on 5/15/2023 at [facility].  I note that Dr. Rosales also recommended placement of a left-sided PNT to relieve the hydronephrosis.  I did have her stay at least until the creatinine resolved to determine whether she needed to be admitted to the hospital for urgent PNT placement, but given her creatinine of only 1.15, I do not believe she she required hospital admission (she was also uncertain whether she would consider being admitted to the hospital regardless of recommendation or not).  She remains very hesitant about this and I called her on the evening of 6/9/2023 to discuss the ultrasound results and provide additional counseling.  I again reemphasized the need for PNT placement to relieve the hydronephrosis as there does not appear to be another option given the lack of visibility of the ureterovesicular junction to place a stent.  I discussed that the pain would not resolve on his own and improvement in kidney function would be an important part of making her eligible for the best possible treatment.  Without resolution of this any treatment would potentially be subpar due to the inability to use cisplatin.  She would discuss with family members and then make a decision, although again I recommended she have the PNT placement.    Finally I discussed that I would be willing to fill a temporary prescription of pain medication which she said Norco 5 mg was effective.  I discussed that I would not be a long-term pain management provider for her and that I do not take over any other controlled substance prescriptions that already existing.  Again I do believe she has a real reason for pain from the hydronephrosis but I again emphasized that this must be intervened on to  try to relieve the pain rather than just mask with narcotics.  She does have a note of clonazepam in her chart which she takes for sleep per her report.  She does not take this every night and I did recommend that she avoid taking this when she takes the Norco for the pain due to the additive effects.  I also provided a prescription for Narcan which is to be filled at the same time.  She also has significant neuropathy and is taking gabapentin and I also emphasized the additive effects of gabapentin and Norco as well and that she should be careful and take no more than one 5 mg Norco pill every 6 hours.    I will see her back on 6/26/2023 after PET scan has been completed and we have a further discussion about staging of her cancer and the possible treatment options.  I have already reserved infusion appointments for gemcitabine and cisplatin should she be eligible for this therapy although these infusions could be changed to what ever therapy is necessary.    PLAN:   1. Please get labs and an ultrasound today.  I am very worried about your kidney function.   2. I will call with the results and what needs to be done next.  You may need to be admitted to the hospital to get a tube placed into your kidney to relieve the pressure.   3. I will send a prescription for pain medication to your pharmacy once I see your kidney function.   4. I am sending you home with narcan as well in case you get too sleepy from your pain medications.   5. Please do not use the Klonopin/clonazopam if you are not taking every day.  This will make you much more sleepy with the gabapentin and pain medication.   6. Get a PET scan in 2 weeks.  I cannot order sooner due to insurance restrictions.   7. See me back on 6/26 at 10:00.      A total of 120 minutes were spent on this patient on the day of the encounter, of which more than 50% of this time was used for counseling and coordination of care.  The patient was given the opportunity to ask  multiple questions today, all of which were answered to their satisfaction.  Extended service time on the day of the encounter for follow-up in person regarding labs plans and discussion with interventional radiology regarding PNT placement, and obtaining records which had not been sent prior to appointment.    Tk Kirkland MD, PhD   of Medicine   Oncology/BMT/Cellular Therapies

## 2023-06-07 NOTE — LETTER
6/7/2023         RE: Abiel Ruiz  [address]  [facility] [address]        Dear Colleague,    Thank you for referring your patient, Abiel Ruiz, to the [facility] CAN[facility]ER [facility]. Please see a copy of my visit note below.      [facility] Medical Oncology Second Opinion Consultation Note       Date of visit: June 7, 2023    Dear Dr. Reginald Rosales, thank you for referring your patient for a Second Opinion consultation at the [facility].  A brief overview of his oncological history as well as my recommendations follow below.    CC: MIBC, consideration of neoadjuvant chemotherapy.      HPI: Abiel presents today for consideration of neoadjuvant chemotherapy for UC.  She initially had blood in urine with a blood clot in February.  She had pelvic/perineal pain also and underwent US with evidence of myometrial mass consistent with fibroid.  She was seen by Urology 4/19/23 by Dr. Hancock of [facility].  She knows she has bladder cancer and did not have PNT placed in May (5/15) as originally suggested.  She wasn't sure why it was recommended and was scared of the procedure also.  Today she has significant flank pain on the L side.  She has not had additional blood in her urine since.  She has been taking 3 to 4 five mg hydrocodone acetaminophen daily and ran out about a week ago.  She has been calling Dr. Hacnock's office but has not gotten a reply about refill.  She is here alone and is pretty anxious about the diagnosis and isn't sure what to do or whether she wants to have her bladder removed.  She would like to talk today, work on the pain, and then discuss the recommendations with her family members in [place] before making a decision. At baseline she has neuropathy in her feet and some in her hands.  She has been taking gabapentin 600mg three times daily for years, but isn't sure why she has the neuropathy.      ONCOLOGY HISTORY:  2/2023-Initial hematuria with clot.    2/17/2023-Pelvic US with note of fibroid.   4/21/2023-Initial urology appt with Dr. Hancock   5/2/2023-CT urogram with note of L sided hydronephrosis and fat density at [facility]. Note of prostate in formal read, which was later edited.  This may be concerning for metastatic disease.    5/10/2023-Cystoscopy with TURBT-L ureteral orifice not visible.  Op report notes dense, non-papillary mass involving majority of the trigone to the neck of the bladder.  Some of the tumor was reported to be removed, but it sounds like significant tumor remained post resection due to the technical difficulty.  Tumor up to 5cm There was a note in the report that there may be a mass (also thought by Dr. Hancock to be metastatic disease).  L PNT was recommended.  Pathology notable for invasive, high grade urothelial carcinoma with muscle invasion. LVI not present.  Block A2 recommended for testing.   6/1/23-Referral to Dr. Rosales at [facility] for consideration of cystectomy.  Recommendation made for med onc for neoadjuvant therapy consideration and for PNT placement again.   6/7/23-Initial med onc appointment.  Will proceed with staging using PET CT and recommend PNT placement.  IR referral made for L PNT.  Repeat renal US with L hydronephrosis.      GENOMIC STUDIES: None currently.  Recommend Caris testing of bladder tumor. Block A2 preferred.     TREATMENT HISTORY:   TURBT 5/10/23    GUIDELINES USED: Pending staging.     INTENT OF THERAPY: Pending staging.     CLINICAL TRIALS:  None    PMH:  Neuropathy, idiopathic   Anxiety   Essential HTN  Chronic gout  History of tobacco abuse/dependence.  HLD  COPD   Endometriosis  Sleep apnea  DJD  Chronic pain     PSH:  TURBT  TKA, R 1/2013  R Total hip arthroplasty 2/16/16  Endometriosis surgery x2    FAMILY HX: denies family history of  malignancies    SOCIAL:  Current smoker. 1/2 PPD x39 years  No smokeless tobacco use  No EtoH   No  "recreational drugs  No herbs/supplements not on medication list already.     ALLERGIES:     MEDS:  Current Outpatient Medications   Medication Instructions     diphenhydrAMINE-acetaminophen (TYLENOL PM)  MG tablet 1 tablet, Oral, AT BEDTIME PRN     gabapentin (NEURONTIN) 600 mg, Oral, 3 TIMES DAILY     HYDROcodone-acetaminophen (NORCO) 5-325 MG tablet 1 tablet, Oral, EVERY 6 HOURS PRN     HYDROcodone-acetaminophen (NORCO) 5-325 MG tablet 1 tablet, Oral, EVERY 6 HOURS PRN     hydrOXYzine (VISTARIL) 25 MG capsule hydroxyzine pamoate 25 mg capsule   TAKE 1 CAP BY MOUTH THREE TIMES DAILY AS NEEDED. CAN TAKE 2 CAPS NIGHTLY AS NEEDED FOR SLEEP     simvastatin (ZOCOR) 20 MG tablet simvastatin 20 mg tablet   TAKE 1 TABLET BY MOUTH ONCE A DAY FOR CHOLESTEROL     ROS-Remainder of 14 point ROS reviewed and negative except as in HPI.    PHYSICAL EXAM:  ECOG-PS=1    /75   Pulse 77   Temp 97.9  F (36.6  C) (Oral)   Resp 16   Ht 1.702 m (5' 7\")   Wt 80.4 kg (177 lb 4.8 oz)   SpO2 100%   BMI 27.77 kg/m    Exam:  Constitutional: healthy, alert and no distress  Head: Normocephalic. No masses or lesions.   Neck: Neck supple. No adenopathy grossly.   ENT: ENT exam normal, no neck nodes or sinus tenderness  Cardiovascular: negative, No edema or JVD.  Respiratory: negative, normal WOB on RA, no wheezing or rhonchi   Gastrointestinal: Abdomen soft, mild tenderness, flank tenderness present on L.   : Deferred  Musculoskeletal: extremities normal- no gross deformities noted and normal muscle tone  Skin: no suspicious lesions or rashes on exposed areas of skin   Neurologic: CNII-XII grossly intact, normal speech and mentation.   Psychiatric: mentation appears normal, anxious and worried  Hematologic/Lymphatic/Immunologic: no grossly enlarged cervical LN.     LABS AND IMAGES:    CT urogram from [facility]. Personally reviewed. Clear L sided hydronephrosis.  No overt LAD.  Bladder is obscured by hip prosthesis " artifact, although there does appear to be a deep pelvic mass that I cannot identify.      Renal US at [facility] 6/7/23. Notable for L sided hydronephrosis.     No prior baseline creatinine available.    Labs from 6/7/23 with creatinine of 1.15 and GFR of 51mL/minute.  CBC WNL.  LFTs WNL.  Uric acid mildly elevated to 7.9.      IMPRESSION AND PLAN:    # muscle-invasive urothelial carcinoma, pending staging.   Abiel presents for initial medical oncology consultation and possible consideration of neoadjuvant chemotherapy for muscle invasive urothelial carcinoma.  She initially had hematuria with clot in February 2023 but it does not seem like she had been evaluated until at least April 2023.  She underwent cystoscopy with TURBT at [facility] 5/10/2023 with note of a significant tumor in the bladder trigone to the bladder neck which sounds like it was unable to be completely removed due to size location and invasiveness.  This was at least 5 cm per the report and pathology was notable for muscle invasive urothelial carcinoma.  CT urogram prior to this visit was notable for a possible mass blocking the left ureterovesical junction, which was also noted to not be visible on cystoscopy and precluded ureteral stent placement.  Notably there was an additional mass potentially posterior to the bladder which was initially interpreted as the prostate by the reading radiologist, but this appears to be another mass.  The etiology of this mass is unclear as she also has a history of endometriosis.  Unfortunately much of the pelvis is obscured by her right hip prosthesis on the CT study.  She saw Dr. Adler on 6/1/2023 for consideration of a cystectomy.    At her initial appointment on 6/7/2023, we discussed the basis for cancer treatment and the distinction between curative and palliative intent.  Unfortunately we do not have all the imaging and staging information we need to have that full discussion.  We discussed the  basics of urothelial carcinoma including the differences between muscle invasive and nonmuscle invasive and the rationale for cystectomy if this is isolated to the bladder itself and muscle invasive.  We discussed the approximate 10% benefit of neoadjuvant chemotherapy use prior to cystectomy and the fact that this would be curative intent.  Unfortunately I also do have concerns for possible metastatic versus locally advanced disease based on the indeterminate mass from the CT urogram.  I am recommending a PET CT for further evaluation and staging in order to determine eligibility for cystectomy.  I discussed that this requires at least 2 weeks for insurance approval and I have requested this to be scheduled exactly 2 weeks out in order to to get the procedure and the read as fast as possible.    Unfortunately her GFR prior to our appointment was only 44 mL/min, and we extensively discussed the need to improve renal function for consideration of cisplatin or carboplatin based chemotherapies.  Unfortunately there does not appear to be recent creatinine for baseline, but I believe this is most likely due to the left hydronephrosis due to the bladder mass obscuring the left ureterovesicular junction.  I discussed the importance of repeating imaging with an ultrasound at our initial appointment to evaluate the status of hydronephrosis, which was done immediately after appointment.  Unfortunately there was still left-sided hydronephrosis present and her creatinine was 1.15 with a GFR 51.  I did discuss my recommendation for possible hospitalization prior to the return of labs due to my concern for possible urgent need for PNT placement, however given the creatinine of just 1.15 I believe this can be done as an outpatient.      She did have significant left-sided flank pain which is previously treated with Norco from Dr. Hancock, however she has run out and not been able to get a refill.  Given the extent of her  hydronephrosis I believe she is a clear reason for pain and we discussed that I would be willing to help manage pain from his hydronephrosis until she is able to get PNT placed.  I emphasized the need for an intervention to relieve this pain (PNT placement) otherwise we are just masking it with medications and will be unlikely to ever resolve spontaneously.  I made referral to interventional radiology for placement of left-sided PNT, which was approved.  She was very hesitant and notes she did not show up for her initial PNT appointment on 5/15/2023 at [facility].  I note that Dr. Rosales also recommended placement of a left-sided PNT to relieve the hydronephrosis.  I did have her stay at least until the creatinine resolved to determine whether she needed to be admitted to the hospital for urgent PNT placement, but given her creatinine of only 1.15, I do not believe she she required hospital admission (she was also uncertain whether she would consider being admitted to the hospital regardless of recommendation or not).  She remains very hesitant about this and I called her on the evening of 6/9/2023 to discuss the ultrasound results and provide additional counseling.  I again reemphasized the need for PNT placement to relieve the hydronephrosis as there does not appear to be another option given the lack of visibility of the ureterovesicular junction to place a stent.  I discussed that the pain would not resolve on his own and improvement in kidney function would be an important part of making her eligible for the best possible treatment.  Without resolution of this any treatment would potentially be subpar due to the inability to use cisplatin.  She would discuss with family members and then make a decision, although again I recommended she have the PNT placement.    Finally I discussed that I would be willing to fill a temporary prescription of pain medication which she said Norco 5 mg was effective.  I  discussed that I would not be a long-term pain management provider for her and that I do not take over any other controlled substance prescriptions that already existing.  Again I do believe she has a real reason for pain from the hydronephrosis but I again emphasized that this must be intervened on to try to relieve the pain rather than just mask with narcotics.  She does have a note of clonazepam in her chart which she takes for sleep per her report.  She does not take this every night and I did recommend that she avoid taking this when she takes the Norco for the pain due to the additive effects.  I also provided a prescription for Narcan which is to be filled at the same time.  She also has significant neuropathy and is taking gabapentin and I also emphasized the additive effects of gabapentin and Norco as well and that she should be careful and take no more than one 5 mg Norco pill every 6 hours.    I will see her back on 6/26/2023 after PET scan has been completed and we have a further discussion about staging of her cancer and the possible treatment options.  I have already reserved infusion appointments for gemcitabine and cisplatin should she be eligible for this therapy although these infusions could be changed to what ever therapy is necessary.    PLAN:   Please get labs and an ultrasound today.  I am very worried about your kidney function.   I will call with the results and what needs to be done next.  You may need to be admitted to the hospital to get a tube placed into your kidney to relieve the pressure.   I will send a prescription for pain medication to your pharmacy once I see your kidney function.   I am sending you home with narcan as well in case you get too sleepy from your pain medications.   Please do not use the Klonopin/clonazopam if you are not taking every day.  This will make you much more sleepy with the gabapentin and pain medication.   Get a PET scan in 2 weeks.  I cannot order  sooner due to insurance restrictions.   See me back on 6/26 at 10:00.      A total of 120 minutes were spent on this patient on the day of the encounter, of which more than 50% of this time was used for counseling and coordination of care.  The patient was given the opportunity to ask multiple questions today, all of which were answered to their satisfaction.  Extended service time on the day of the encounter for follow-up in person regarding labs plans and discussion with interventional radiology regarding PNT placement, and obtaining records which had not been sent prior to appointment.    Tk Kirkland MD, PhD   of Medicine   Oncology/BMT/Cellular Therapies      Again, thank you for allowing me to participate in the care of your patient.        Sincerely,        Tk Kirkland MD

## 2023-06-07 NOTE — NURSING NOTE
Chief Complaint   Patient presents with     Oncology Clinic Visit     New Eval for Bladder Neoplasm     Labs drawn with  by rn.  Pt tolerated well.     Jordy Ruby RN

## 2023-06-07 NOTE — PATIENT INSTRUCTIONS
Abiel,   It was nice meeting you today.      Please get labs and an ultrasound today.  I am very worried about your kidney function.   I will call with the results and what needs to be done next.  You may need to be admitted to the hospital to get a tube placed into your kidney to relieve the pressure.   I will send a prescription for pain medication to your pharmacy once I see your kidney function.   I am sending you home with narcan as well in case you get too sleepy from your pain medications.   Please do not use the Klonopin/clonazopam if you are not taking every day.  This will make you much more sleepy with the gabapentin and pain medication.   Get a PET scan in 2 weeks.  I cannot order sooner due to insurance restrictions.   See me back on 6/26 at10 10

## 2023-06-08 NOTE — PROGRESS NOTES
[facility]: Cancer Care Initial Note                                    Discussion with Patient:                                                    Introduced self and role of RN Care Coordinator at [facility]. Provided my contact information, [facility] phone number (which has options to talk with a Nurse available 24/7 - triage and RNCC via this option during business hours).     Reviewed [facility] care team members including midlevel providers in oncology dept and Dr. Kirkland's usual clinic hours.    Pt voiced understanding and appreciation of above information and denies any further questions, and he/she understands that I will follow and provide coordination as needed      Assessment:                                                      Initial  Current living arrangement:: I live alone  Informal Support system:: Family  Bed or wheelchair confined:: No  Mobility Status: Independent w/Device  Transportation means:: Family  Medication adherence problem (GOAL):: No  Knowledgeable about how to use meds:: Yes  Medication side effects suspected:: No  Pain Score: No Pain (0)        Intervention/Education provided during outreach:                                                       See above note    Patient to follow up as scheduled at next appt    Cat BAUER, RN   Care Coordinator  [facility]

## 2023-06-09 ENCOUNTER — TELEPHONE (OUTPATIENT)
Dept: ONCOLOGY | Facility: CLINIC | Age: 68
End: 2023-06-09
Payer: COMMERCIAL

## 2023-06-09 NOTE — TELEPHONE ENCOUNTER
Called Abiel and discussed US results with L hydronephrosis.      At this moment she is not eligible for cisplatin and would either have to proceed directly to surgery or undergo chemo/immune therapy with reduced chance for cure.      Discussed my recommendation is to get PNT placed and to see how her kidney function improves as cisplatin would be her best option for a cure by about 10% compared to cystectomy alone.      Discussed the anatomy of bladder, kidney and ureter and why PNT is recommended and that it could not be stented by urology.      She will talk with relatives about the plan and get PET scan before our next visit.  She will decide what to do about PNTs after discussion with family.

## 2023-06-13 ENCOUNTER — LAB REQUISITION (OUTPATIENT)
Dept: LAB | Facility: CLINIC | Age: 68
End: 2023-06-13
Payer: COMMERCIAL

## 2023-06-13 LAB
PATH REPORT.COMMENTS IMP SPEC: ABNORMAL
PATH REPORT.COMMENTS IMP SPEC: ABNORMAL
PATH REPORT.COMMENTS IMP SPEC: YES
PATH REPORT.FINAL DX SPEC: ABNORMAL
PATH REPORT.GROSS SPEC: ABNORMAL
PATH REPORT.MICROSCOPIC SPEC OTHER STN: ABNORMAL
PATH REPORT.RELEVANT HX SPEC: ABNORMAL
PATH REPORT.RELEVANT HX SPEC: ABNORMAL
PATH REPORT.SITE OF ORIGIN SPEC: ABNORMAL

## 2023-06-13 PROCEDURE — 88321 CONSLTJ&REPRT SLD PREP ELSWR: CPT | Performed by: PATHOLOGY

## 2023-06-15 ENCOUNTER — TELEPHONE (OUTPATIENT)
Dept: ONCOLOGY | Facility: CLINIC | Age: 68
End: 2023-06-15
Payer: COMMERCIAL

## 2023-06-15 PROCEDURE — 99207 PR NO BILLABLE SERVICE THIS VISIT: CPT | Performed by: STUDENT IN AN ORGANIZED HEALTH CARE EDUCATION/TRAINING PROGRAM

## 2023-06-15 NOTE — TELEPHONE ENCOUNTER
I called Abiel to follow-up after appointment.  I noted that she had been scheduled for her PET scan on 6/21 and then was following up to see me 6/26 with labs beforehand and possible infusion after our appointment.    I did note she been approved by interventional radiology for PNT placement to relieve her left-sided hydronephrosis however this does not appear to have been scheduled which concerns me.  When I talked to her she notes she is very hesitant to get the PNT placed, and despite discussing this at our initial appointment by phone last week and today again she does not seem to understand the relationship between her pain and the hydronephrosis.  I reiterated that I am concerned that without relief of the hydronephrosis her pain will continue.  I also discussed that without relief of the hydronephrosis I am unlikely to be able to give her optimal chemotherapy with cisplatin.  I discussed that her GFR is 51 and to give cisplatin I would need a minimum of 60 for the GFR.    She stated today that she does not want to get a cystectomy, and seems fairly confident in this.  She did want me to discuss with a family member regarding the care recommendations for the PNT and possible treatment.    I discussed that I would prefer to move back her chemotherapy infusion appointment until we get a chance to talk more on 6/26 about her PET scan results and to reevaluate her kidney function to determine the best options for her.  She was concerned that I am was considering moving back to chemotherapy infusion appointment.  I noted that I had reserved a slot for her to receive chemotherapy assuming she would consider the PNT placement and she would have improved kidney function by the time of her appointment.  As noted above she has not scheduled her PNT yet.  I recommended that we back the infusion appointments up by 2 weeks to allow for us to go over imaging and lab results in the best plan of action based on  guidelines and also her preferences.  She was very hesitant to do this so I said we would leave the infusion appointment at the moment.  I am also not able to get chemotherapy approved without scheduling an infusion appointment and ordering which I have done.  Ultimately the regimen of chemotherapy may need to change depending on her staging and her kidney function.    I will have my RN coordinator, Cat, give her a call to follow-up and get her family members phone number in order to have discussion.  She would also like to have her family were on the phone at her next appointment which I completely agree with doing.  We will be sure to call her family member when she arrives to discuss the results on the 26th.

## 2023-06-16 ENCOUNTER — PATIENT OUTREACH (OUTPATIENT)
Dept: ONCOLOGY | Facility: CLINIC | Age: 68
End: 2023-06-16
Payer: COMMERCIAL

## 2023-06-16 NOTE — PROGRESS NOTES
[facility]: Cancer Care                                                                                          RNCC called and LVM to have patient call back to discuss communication with family members and PNT placement. Waiting for call back    Cat BAUER, RN   Care Coordinator  [facility]

## 2023-06-21 ENCOUNTER — ANCILLARY PROCEDURE (OUTPATIENT)
Dept: PET IMAGING | Facility: CLINIC | Age: 68
End: 2023-06-21
Attending: STUDENT IN AN ORGANIZED HEALTH CARE EDUCATION/TRAINING PROGRAM
Payer: COMMERCIAL

## 2023-06-21 DIAGNOSIS — C67.8 MALIGNANT NEOPLASM OF OVERLAPPING SITES OF BLADDER (H): ICD-10-CM

## 2023-06-21 LAB — GLUCOSE SERPL-MCNC: 92 MG/DL (ref 70–99)

## 2023-06-21 RX ORDER — FUROSEMIDE 10 MG/ML
40 INJECTION INTRAMUSCULAR; INTRAVENOUS ONCE
Status: COMPLETED | OUTPATIENT
Start: 2023-06-21 | End: 2023-06-21

## 2023-06-21 RX ADMIN — FUROSEMIDE 40 MG: 10 INJECTION INTRAMUSCULAR; INTRAVENOUS at 10:03

## 2023-06-23 DIAGNOSIS — C67.8 MALIGNANT NEOPLASM OF OVERLAPPING SITES OF BLADDER (H): Primary | ICD-10-CM

## 2023-06-24 ENCOUNTER — TELEPHONE (OUTPATIENT)
Dept: ONCOLOGY | Facility: CLINIC | Age: 68
End: 2023-06-24
Payer: COMMERCIAL

## 2023-06-24 NOTE — TELEPHONE ENCOUNTER
I called Abiel to discuss the results of her PET scan.  There are a few lymph nodes in the abdomen which are too small to be biopsied and are indeterminant as to whether they are truly involved with urothelial carcinoma or not.  However they could be potentially suspicious given their uptake of radiotracer.  She does not appear to have any other evidence of metastatic disease to the bone or to the chest.  There is a mass abutting the bladder that was not well-defined on this particular PET scan or on the prior CT due to her hip implant.  It was recommended by radiology to undergo an MRI which I also recommended to her as well.  She is willing to do this and we will get this ordered on Monday since I am seeing her at 8:30 in the morning.    I discussed some other findings on the PET scan including concern for possible fibrosis along with emphysema.  She was a former smoker however the finding of possible fibrosis is also concerning.  I also discussed again the redemonstration of left-sided hydronephrosis which was recommended by radiology to also consider placement of PNT or stent.  We know that stent placement is unlikely and we did discuss again possibility of PNT's which she has discussed with her family members.  Her family members appear to support having a PNT placed as well.  She does still have pain and is still taking anywhere between 2 and four 5 mg tablets of oxycodone daily.  She is does states she has enough to get to her appointment on Monday.  She has not been taking Klonopin since starting the oxycodone, but is having a lot of problems sleeping.    Discussed that we will meet on Monday and we will call her family members during that appointment.  We will discuss more about what we found and the possible therapy options.  We will get an MRI and I will see her back probably 2 weeks afterwards once I am back from vacation to discuss the final treatment plan and also get insurance approval for  therapy.  There is also typically a delay in getting an infusion appointment and a port if this is deemed necessary based on the regimen.

## 2023-06-26 ENCOUNTER — APPOINTMENT (OUTPATIENT)
Dept: LAB | Facility: CLINIC | Age: 68
End: 2023-06-26
Attending: STUDENT IN AN ORGANIZED HEALTH CARE EDUCATION/TRAINING PROGRAM
Payer: COMMERCIAL

## 2023-06-26 ENCOUNTER — ONCOLOGY VISIT (OUTPATIENT)
Dept: ONCOLOGY | Facility: CLINIC | Age: 68
End: 2023-06-26
Attending: STUDENT IN AN ORGANIZED HEALTH CARE EDUCATION/TRAINING PROGRAM
Payer: COMMERCIAL

## 2023-06-26 ENCOUNTER — PATIENT OUTREACH (OUTPATIENT)
Dept: ONCOLOGY | Facility: CLINIC | Age: 68
End: 2023-06-26

## 2023-06-26 VITALS
DIASTOLIC BLOOD PRESSURE: 58 MMHG | SYSTOLIC BLOOD PRESSURE: 99 MMHG | TEMPERATURE: 97.8 F | HEART RATE: 61 BPM | BODY MASS INDEX: 26.99 KG/M2 | OXYGEN SATURATION: 97 % | RESPIRATION RATE: 16 BRPM | WEIGHT: 172.3 LBS

## 2023-06-26 DIAGNOSIS — C67.8 MALIGNANT NEOPLASM OF OVERLAPPING SITES OF BLADDER (H): ICD-10-CM

## 2023-06-26 DIAGNOSIS — C67.8 MALIGNANT NEOPLASM OF OVERLAPPING SITES OF BLADDER (H): Primary | ICD-10-CM

## 2023-06-26 LAB
ALBUMIN SERPL BCG-MCNC: 4.3 G/DL (ref 3.5–5.2)
ALP SERPL-CCNC: 67 U/L (ref 35–104)
ALT SERPL W P-5'-P-CCNC: 11 U/L (ref 0–50)
ANION GAP SERPL CALCULATED.3IONS-SCNC: 8 MMOL/L (ref 7–15)
AST SERPL W P-5'-P-CCNC: 18 U/L (ref 0–45)
BASOPHILS # BLD AUTO: 0.1 10E3/UL (ref 0–0.2)
BASOPHILS NFR BLD AUTO: 1 %
BILIRUB SERPL-MCNC: 0.4 MG/DL
BUN SERPL-MCNC: 14.7 MG/DL (ref 8–23)
CALCIUM SERPL-MCNC: 9.8 MG/DL (ref 8.8–10.2)
CHLORIDE SERPL-SCNC: 107 MMOL/L (ref 98–107)
CREAT SERPL-MCNC: 1.24 MG/DL (ref 0.51–0.95)
DEPRECATED HCO3 PLAS-SCNC: 27 MMOL/L (ref 22–29)
EOSINOPHIL # BLD AUTO: 0.1 10E3/UL (ref 0–0.7)
EOSINOPHIL NFR BLD AUTO: 2 %
ERYTHROCYTE [DISTWIDTH] IN BLOOD BY AUTOMATED COUNT: 13 % (ref 10–15)
GFR SERPL CREATININE-BSD FRML MDRD: 47 ML/MIN/1.73M2
GLUCOSE SERPL-MCNC: 96 MG/DL (ref 70–99)
HCT VFR BLD AUTO: 39.5 % (ref 35–47)
HGB BLD-MCNC: 12.6 G/DL (ref 11.7–15.7)
IMM GRANULOCYTES # BLD: 0 10E3/UL
IMM GRANULOCYTES NFR BLD: 0 %
INR PPP: 1.06 (ref 0.85–1.15)
LYMPHOCYTES # BLD AUTO: 4 10E3/UL (ref 0.8–5.3)
LYMPHOCYTES NFR BLD AUTO: 51 %
MAGNESIUM SERPL-MCNC: 2.1 MG/DL (ref 1.7–2.3)
MCH RBC QN AUTO: 30.3 PG (ref 26.5–33)
MCHC RBC AUTO-ENTMCNC: 31.9 G/DL (ref 31.5–36.5)
MCV RBC AUTO: 95 FL (ref 78–100)
MONOCYTES # BLD AUTO: 0.7 10E3/UL (ref 0–1.3)
MONOCYTES NFR BLD AUTO: 9 %
NEUTROPHILS # BLD AUTO: 2.8 10E3/UL (ref 1.6–8.3)
NEUTROPHILS NFR BLD AUTO: 37 %
NRBC # BLD AUTO: 0 10E3/UL
NRBC BLD AUTO-RTO: 0 /100
PHOSPHATE SERPL-MCNC: 3.7 MG/DL (ref 2.5–4.5)
PLATELET # BLD AUTO: 242 10E3/UL (ref 150–450)
POTASSIUM SERPL-SCNC: 4.1 MMOL/L (ref 3.4–5.3)
PROT SERPL-MCNC: 7.6 G/DL (ref 6.4–8.3)
RBC # BLD AUTO: 4.16 10E6/UL (ref 3.8–5.2)
SODIUM SERPL-SCNC: 142 MMOL/L (ref 136–145)
URATE SERPL-MCNC: 8.4 MG/DL (ref 2.4–5.7)
WBC # BLD AUTO: 7.6 10E3/UL (ref 4–11)

## 2023-06-26 PROCEDURE — 84550 ASSAY OF BLOOD/URIC ACID: CPT | Performed by: STUDENT IN AN ORGANIZED HEALTH CARE EDUCATION/TRAINING PROGRAM

## 2023-06-26 PROCEDURE — 99215 OFFICE O/P EST HI 40 MIN: CPT | Performed by: STUDENT IN AN ORGANIZED HEALTH CARE EDUCATION/TRAINING PROGRAM

## 2023-06-26 PROCEDURE — 85610 PROTHROMBIN TIME: CPT | Performed by: STUDENT IN AN ORGANIZED HEALTH CARE EDUCATION/TRAINING PROGRAM

## 2023-06-26 PROCEDURE — 84100 ASSAY OF PHOSPHORUS: CPT | Performed by: STUDENT IN AN ORGANIZED HEALTH CARE EDUCATION/TRAINING PROGRAM

## 2023-06-26 PROCEDURE — 36415 COLL VENOUS BLD VENIPUNCTURE: CPT | Performed by: STUDENT IN AN ORGANIZED HEALTH CARE EDUCATION/TRAINING PROGRAM

## 2023-06-26 PROCEDURE — 80053 COMPREHEN METABOLIC PANEL: CPT | Performed by: STUDENT IN AN ORGANIZED HEALTH CARE EDUCATION/TRAINING PROGRAM

## 2023-06-26 PROCEDURE — 83735 ASSAY OF MAGNESIUM: CPT | Performed by: STUDENT IN AN ORGANIZED HEALTH CARE EDUCATION/TRAINING PROGRAM

## 2023-06-26 PROCEDURE — 85025 COMPLETE CBC W/AUTO DIFF WBC: CPT | Performed by: STUDENT IN AN ORGANIZED HEALTH CARE EDUCATION/TRAINING PROGRAM

## 2023-06-26 PROCEDURE — G0463 HOSPITAL OUTPT CLINIC VISIT: HCPCS | Performed by: STUDENT IN AN ORGANIZED HEALTH CARE EDUCATION/TRAINING PROGRAM

## 2023-06-26 RX ORDER — HYDROCODONE BITARTRATE AND ACETAMINOPHEN 5; 325 MG/1; MG/1
1 TABLET ORAL EVERY 6 HOURS PRN
Qty: 60 TABLET | Refills: 0 | Status: SHIPPED | OUTPATIENT
Start: 2023-06-26 | End: 2023-07-14

## 2023-06-26 RX ORDER — DOCUSATE SODIUM 100 MG/1
100 CAPSULE, LIQUID FILLED ORAL 2 TIMES DAILY
Qty: 60 CAPSULE | Refills: 4 | Status: ON HOLD | OUTPATIENT
Start: 2023-06-26 | End: 2023-07-20

## 2023-06-26 ASSESSMENT — PAIN SCALES - GENERAL: PAINLEVEL: SEVERE PAIN (6)

## 2023-06-26 NOTE — CONSULTS
Outpatient IR Referral  06/26/23    Abiel Ruiz  [id number]    Referring Provider:  Tk Kirkland MD in  ONCOLOGY ADULT   Call Back # [phone]    IR referral Request:  Drains/Tubes, Placement, Nephro-Ureteral, Left.  Pt requesting GA if possible.    ===    Procedure Approved for: IR left percutaneous nephrostomy tube placement with nurse administered moderate sedation.    I spoke with referring directly and clarified that the request is for a left percutaneous nephrostomy tube.  Patient has fears over procedures and is asking for appropriate procedural sedation/comfort.    Procedure previously approved, see prior IR note.    ===  Brief History:      Diagnosis: Malignant neoplasm of overlapping sites of bladder (H) [C67.8]    Urothelial carcinoma with blockage of L ureterovesicular junction by mass and hydronephrosis. PNT placement.    ===  Plan:    Procedure order placed.    No IR pre-op clinic visit is required.    Rafa Espinosa PA-C  Interventional Radiology   IR on-call pager: [phone]

## 2023-06-26 NOTE — PROGRESS NOTES
[facility] Medical Oncology Second Opinion Consultation Note       Date of visit: June 26, 2023    INTERVAL HISTORY: Pain is not improving since last visit.  She is using 2-3 Norco daily and occasionally 4.  She is not taking clonopin as I requested, but she is having difficulty sleeping without it.  Pain is in abdomen and left flank.  She is not doing as much as usual due to pain and feeling more tired.  No blood in urine reported.  Appetite isn't as good as before.  She is very worried about what is going to happen with treatment and if PNTs are placed.  Neuropathy is about the same.  She notes toes/feet do not feel floor like they should.  Some neuropathy in hands also.  Two family members join by phone for today's visit.     ONCOLOGY HISTORY:  2/2023-Initial hematuria with clot.    2/17/2023-Pelvic US with note of fibroid.   4/21/2023-Initial urology appt with Dr. Hancock   5/2/2023-CT urogram with note of L sided hydronephrosis and fat density at [facility]. Note of prostate in formal read, which was later edited.  This may be concerning for metastatic disease.    5/10/2023-Cystoscopy with TURBT-L ureteral orifice not visible.  Op report notes dense, non-papillary mass involving majority of the trigone to the neck of the bladder.  Some of the tumor was reported to be removed, but it sounds like significant tumor remained post resection due to the technical difficulty.  Tumor up to 5cm There was a note in the report that there may be a mass (also thought by Dr. Hancock to be metastatic disease).  L PNT was recommended.  Pathology notable for invasive, high grade urothelial carcinoma with muscle invasion. LVI not present.  Block A2 recommended for testing.   6/1/23-Referral to Dr. Rosales at [facility] for consideration of cystectomy.  Recommendation made for med onc for neoadjuvant therapy consideration and for PNT placement again.   6/7/23-Initial med onc appointment.  Will proceed with staging using PET CT and  recommend PNT placement.  IR referral made for L PNT.  Repeat renal US with L hydronephrosis.    6/21/23-PET CT with a few indeterminate pelvic LN and mass involving or near bladder. MR pelvis recommended.  Will order at 6/26/23 appt.   6/26/23-Discussed PET scan results and mass near bladder that was not well-defined/need for MR pelvis.  She prefers open MR which was sent to [facility].  She was more amenable to PNT placement, which I discussed with IR.  Return in two weeks with imaging and hopefully after PNT for eval of renal function/pain management.      GENOMIC STUDIES: None currently.  Recommend Caris testing of bladder tumor. Block A2 preferred.  We have not been able to discuss consent as of yet due to other staging conversations, pain management, and PNT discussions.      TREATMENT HISTORY:   TURBT 5/10/23    GUIDELINES USED: Pending staging.     INTENT OF THERAPY: Pending staging.     CLINICAL TRIALS:  None    ALLERGIES:     MEDS:  Current Outpatient Medications   Medication Instructions     diphenhydrAMINE-acetaminophen (TYLENOL PM)  MG tablet 1 tablet, Oral, AT BEDTIME PRN     gabapentin (NEURONTIN) 600 mg, Oral, 3 TIMES DAILY     HYDROcodone-acetaminophen (NORCO) 5-325 MG tablet 1 tablet, Oral, EVERY 6 HOURS PRN     HYDROcodone-acetaminophen (NORCO) 5-325 MG tablet 1 tablet, Oral, EVERY 6 HOURS PRN     hydrOXYzine (VISTARIL) 25 MG capsule hydroxyzine pamoate 25 mg capsule   TAKE 1 CAP BY MOUTH THREE TIMES DAILY AS NEEDED. CAN TAKE 2 CAPS NIGHTLY AS NEEDED FOR SLEEP     simvastatin (ZOCOR) 20 MG tablet simvastatin 20 mg tablet   TAKE 1 TABLET BY MOUTH ONCE A DAY FOR CHOLESTEROL     ROS-Remainder of 14 point ROS reviewed and negative except as in HPI.    PHYSICAL EXAM:  ECOG-PS=1    BP 99/58   Pulse 61   Temp 97.8  F (36.6  C) (Oral)   Resp 16   Wt 78.2 kg (172 lb 4.8 oz)   SpO2 97%   BMI 26.99 kg/m    Exam:  Constitutional: healthy, alert and no overt distress  Head: Normocephalic. No masses or  lesions.   Neck: Neck supple. No adenopathy grossly.   ENT: ENT exam normal, no neck nodes or sinus tenderness  Cardiovascular: negative, No edema or JVD.  Respiratory: negative, normal WOB on RA, no wheezing or rhonchi   Gastrointestinal: Abdomen soft, mild tenderness, flank tenderness present on L.   : Deferred  Musculoskeletal: extremities normal- no gross deformities noted and normal muscle tone  Skin: no suspicious lesions or rashes on exposed areas of skin   Neurologic: CNII-XII grossly intact, normal speech and mentation.   Psychiatric: mentation appears normal, anxious and worried  Hematologic/Lymphatic/Immunologic: no grossly enlarged cervical LN.     LABS AND IMAGES:    CT urogram from [facility]. Personally reviewed. Clear L sided hydronephrosis.  No overt LAD.  Bladder is obscured by hip prosthesis artifact, although there does appear to be a deep pelvic mass that I cannot identify.      Renal US at [facility] 6/7/23. Notable for L sided hydronephrosis.     PET from [facility] 6/21/23. Personally reviewed. Some indeterminate abdominal LN.  No bone mets or mets in chest.  Hydronephrosis again redemonstrated.  Mass in or near bladder is ill defined.  Recommendation for MR pelvis from radiology.      No prior baseline creatinine available.    Labs from 6/26/23 with increase in creatinine to 1.24 and GFR of 47.  Lytes and LFTs are WNL.  Uric acid mildly elevated to 8.4.  CBC is WNL.  INR 1.06.     IMPRESSION AND PLAN:    # muscle-invasive urothelial carcinoma, pending staging.   Abiel presents for initial medical oncology consultation and possible consideration of neoadjuvant chemotherapy for muscle invasive urothelial carcinoma.  She initially had hematuria with clot in February 2023 but it does not seem like she had been evaluated until at least April 2023.  She underwent cystoscopy with TURBT at [facility] 5/10/2023 with note of a significant tumor in the bladder trigone to the bladder neck which  sounds like it was unable to be completely removed due to size location and invasiveness.  This was at least 5 cm per the report and pathology was notable for muscle invasive urothelial carcinoma.  CT urogram prior to this visit was notable for a possible mass blocking the left ureterovesical junction, which was also noted to not be visible on cystoscopy and precluded ureteral stent placement.  Notably there was an additional mass potentially posterior to the bladder which was initially interpreted as the prostate by the reading radiologist, but this appears to be another mass.  The etiology of this mass is unclear as she also has a history of endometriosis.  Unfortunately much of the pelvis is obscured by her right hip prosthesis on the CT study.  She saw Dr. Adler on 6/1/2023 for consideration of a cystectomy.    At her initial appointment on 6/7/2023, we discussed the basis for cancer treatment and the distinction between curative and palliative intent.  Unfortunately we do not have all the imaging and staging information we need to have that full discussion.  We discussed the basics of urothelial carcinoma including the differences between muscle invasive and nonmuscle invasive and the rationale for cystectomy if this is isolated to the bladder itself and muscle invasive.  We discussed the approximate 10% benefit of neoadjuvant chemotherapy use prior to cystectomy and the fact that this would be curative intent.  Unfortunately I also do have concerns for possible metastatic versus locally advanced disease based on the indeterminate mass from the CT urogram.  I am recommending a PET CT for further evaluation and staging in order to determine eligibility for cystectomy.  I discussed that this requires at least 2 weeks for insurance approval and I have requested this to be scheduled exactly 2 weeks out in order to to get the procedure and the read as fast as possible.  Her creatinine at her initial appointment is  inadequate for cisplatin consideration, but there are other treatment options.      Finally I discussed that I would be willing to fill a temporary prescription of pain medication which she said Norco 5 mg was effective.  I discussed that I would not be a long-term pain management provider for her and that I do not take over any other controlled substance prescriptions that already existing.  Again I do believe she has a real reason for pain from the hydronephrosis but I again emphasized that this must be intervened on to try to relieve the pain rather than just mask with narcotics.  She does have a note of clonazepam in her chart which she takes for sleep per her report.  She does not take this every night and I did recommend that she avoid taking this when she takes the Norco for the pain due to the additive effects.  I also provided a prescription for Narcan which is to be filled at the same time.  She also has significant neuropathy and is taking gabapentin and I also emphasized the additive effects of gabapentin and Norco as well and that she should be careful and take no more than one 5 mg Norco pill every 6 hours.    At her return visit on 6/26/23, she had two family members join by phone for today's discussion.  We went over the results of her PET including the indeterminate abdominal LN, but no bony disease or disease in the chest.  There is a mass in/around the bladder that is indeterminate.  I have ordered an MRI as recommended by radiology for further evaluation.  We again discussed the need for PNTs and recommendations from multiple physicians for these. We discussed that her pain would not get better without intervention to relieve the hydronephrosis.  I will again refill her Norco for 2 weeks while I am away, but we will again need to have a conversation regarding pain management moving forward at our next appointment.  Her family members on the call were supportive of PNT placement and moving forward  with staging so we can determine a plan.  Unfortunately, it sounds like Abiel does not have a lot of support at home and helps care for a relative as well.  We will need to factor this in as we move forward.  She and her family asked good questions today regarding prognosis and treatment.  At this moment, without final staging I am not able to completely address intent of therapy or timeline.  I am leaning more towards pembrolizumab monotherapy due to GFR, lack of carboplatin availability, and neuropathy that would by complicated/worsened by oxaliplatin regimens.  Neuropathy and possible pulmonary fibrosis also are concerning for the addition of enfortumab, which can worsen both of these symptoms.  We also discussed implications of PNT placement and the impact on her life.  She will not be able to do water aerobics or swim with PNTs due to infection risk.  While I hope PNTs would be temporary, I noted that I cannot guarantee that will be the case. She is also interested in home care nursing to assist with PNT cares, which we will need to look into once they are placed (we can't refer now if not in place I am told).     PLAN:   1. Return to see me on 7/10 at 9AM.  Labs before.   2. Please get the MRI to help evaluate the location and size of the tumor (in the bladder only or outside).   3. Please consider getting the kidney draining tube (PNT) placed.  I would like this done before our next appointment so we can start making treatment decisions for you.   4. I will place a request for a home care nurse.   5. I have refilled your Norco for another two weeks.  I think your pain is not going to get better without the tube being placed.      A total of 40 minutes were spent on this patient on the day of the encounter, of which more than 50% of this time was used for counseling and coordination of care.  The patient was given the opportunity to ask multiple questions today, all of which were answered to their  satisfaction.      Tk Kirkland MD, PhD   of Medicine   Oncology/BMT/Cellular Therapies

## 2023-06-26 NOTE — LETTER
6/26/2023         RE: Abiel Ruiz  [address]  [facility] [address]        Dear Colleague,    Thank you for referring your patient, Abiel Ruiz, to the [facility] CANCER [facility]. Please see a copy of my visit note below.      [facility] Medical Oncology Second Opinion Consultation Note       Date of visit: June 26, 2023    INTERVAL HISTORY: Pain is not improving since last visit.  She is using 2-3 Norco daily and occasionally 4.  She is not taking clonopin as I requested, but she is having difficulty sleeping without it.  Pain is in abdomen and left flank.  She is not doing as much as usual due to pain and feeling more tired.  No blood in urine reported.  Appetite isn't as good as before.  She is very worried about what is going to happen with treatment and if PNTs are placed.  Neuropathy is about the same.  She notes toes/feet do not feel floor like they should.  Some neuropathy in hands also.  Two family members join by phone for today's visit.     ONCOLOGY HISTORY:  2/2023-Initial hematuria with clot.    2/17/2023-Pelvic US with note of fibroid.   4/21/2023-Initial urology appt with Dr. Hancock   5/2/2023-CT urogram with note of L sided hydronephrosis and fat density at [facility]. Note of prostate in formal read, which was later edited.  This may be concerning for metastatic disease.    5/10/2023-Cystoscopy with TURBT-L ureteral orifice not visible.  Op report notes dense, non-papillary mass involving majority of the trigone to the neck of the bladder.  Some of the tumor was reported to be removed, but it sounds like significant tumor remained post resection due to the technical difficulty.  Tumor up to 5cm There was a note in the report that there may be a mass (also thought by Dr. Hancock to be metastatic disease).  L PNT was recommended.  Pathology notable for invasive, high grade urothelial carcinoma with muscle invasion. LVI not present.  Block A2  recommended for testing.   6/1/23-Referral to Dr. Rosales at [facility] for consideration of cystectomy.  Recommendation made for med onc for neoadjuvant therapy consideration and for PNT placement again.   6/7/23-Initial med onc appointment.  Will proceed with staging using PET CT and recommend PNT placement.  IR referral made for L PNT.  Repeat renal US with L hydronephrosis.    6/21/23-PET CT with a few indeterminate pelvic LN and mass involving or near bladder. MR pelvis recommended.  Will order at 6/26/23 appt.   6/26/23-Discussed PET scan results and mass near bladder that was not well-defined/need for MR pelvis.  She prefers open MR which was sent to [facility].  She was more amenable to PNT placement, which I discussed with IR.  Return in two weeks with imaging and hopefully after PNT for eval of renal function/pain management.      GENOMIC STUDIES: None currently.  Recommend Caris testing of bladder tumor. Block A2 preferred.  We have not been able to discuss consent as of yet due to other staging conversations, pain management, and PNT discussions.      TREATMENT HISTORY:   TURBT 5/10/23    GUIDELINES USED: Pending staging.     INTENT OF THERAPY: Pending staging.     CLINICAL TRIALS:  None    ALLERGIES:     MEDS:  Current Outpatient Medications   Medication Instructions    diphenhydrAMINE-acetaminophen (TYLENOL PM)  MG tablet 1 tablet, Oral, AT BEDTIME PRN    gabapentin (NEURONTIN) 600 mg, Oral, 3 TIMES DAILY    HYDROcodone-acetaminophen (NORCO) 5-325 MG tablet 1 tablet, Oral, EVERY 6 HOURS PRN    HYDROcodone-acetaminophen (NORCO) 5-325 MG tablet 1 tablet, Oral, EVERY 6 HOURS PRN    hydrOXYzine (VISTARIL) 25 MG capsule hydroxyzine pamoate 25 mg capsule   TAKE 1 CAP BY MOUTH THREE TIMES DAILY AS NEEDED. CAN TAKE 2 CAPS NIGHTLY AS NEEDED FOR SLEEP    simvastatin (ZOCOR) 20 MG tablet simvastatin 20 mg tablet   TAKE 1 TABLET BY MOUTH ONCE A DAY FOR CHOLESTEROL     ROS-Remainder of 14 point ROS reviewed and negative  except as in HPI.    PHYSICAL EXAM:  ECOG-PS=1    BP 99/58   Pulse 61   Temp 97.8  F (36.6  C) (Oral)   Resp 16   Wt 78.2 kg (172 lb 4.8 oz)   SpO2 97%   BMI 26.99 kg/m    Exam:  Constitutional: healthy, alert and no overt distress  Head: Normocephalic. No masses or lesions.   Neck: Neck supple. No adenopathy grossly.   ENT: ENT exam normal, no neck nodes or sinus tenderness  Cardiovascular: negative, No edema or JVD.  Respiratory: negative, normal WOB on RA, no wheezing or rhonchi   Gastrointestinal: Abdomen soft, mild tenderness, flank tenderness present on L.   : Deferred  Musculoskeletal: extremities normal- no gross deformities noted and normal muscle tone  Skin: no suspicious lesions or rashes on exposed areas of skin   Neurologic: CNII-XII grossly intact, normal speech and mentation.   Psychiatric: mentation appears normal, anxious and worried  Hematologic/Lymphatic/Immunologic: no grossly enlarged cervical LN.     LABS AND IMAGES:    CT urogram from [facility]. Personally reviewed. Clear L sided hydronephrosis.  No overt LAD.  Bladder is obscured by hip prosthesis artifact, although there does appear to be a deep pelvic mass that I cannot identify.      Renal US at [facility] 6/7/23. Notable for L sided hydronephrosis.     PET from [facility] 6/21/23. Personally reviewed. Some indeterminate abdominal LN.  No bone mets or mets in chest.  Hydronephrosis again redemonstrated.  Mass in or near bladder is ill defined.  Recommendation for MR pelvis from radiology.      No prior baseline creatinine available.    Labs from 6/26/23 with increase in creatinine to 1.24 and GFR of 47.  Lytes and LFTs are WNL.  Uric acid mildly elevated to 8.4.  CBC is WNL.  INR 1.06.     IMPRESSION AND PLAN:    # muscle-invasive urothelial carcinoma, pending staging.   Abiel presents for initial medical oncology consultation and possible consideration of neoadjuvant chemotherapy for muscle invasive urothelial carcinoma.  She  initially had hematuria with clot in February 2023 but it does not seem like she had been evaluated until at least April 2023.  She underwent cystoscopy with TURBT at [facility] 5/10/2023 with note of a significant tumor in the bladder trigone to the bladder neck which sounds like it was unable to be completely removed due to size location and invasiveness.  This was at least 5 cm per the report and pathology was notable for muscle invasive urothelial carcinoma.  CT urogram prior to this visit was notable for a possible mass blocking the left ureterovesical junction, which was also noted to not be visible on cystoscopy and precluded ureteral stent placement.  Notably there was an additional mass potentially posterior to the bladder which was initially interpreted as the prostate by the reading radiologist, but this appears to be another mass.  The etiology of this mass is unclear as she also has a history of endometriosis.  Unfortunately much of the pelvis is obscured by her right hip prosthesis on the CT study.  She saw Dr. Adler on 6/1/2023 for consideration of a cystectomy.    At her initial appointment on 6/7/2023, we discussed the basis for cancer treatment and the distinction between curative and palliative intent.  Unfortunately we do not have all the imaging and staging information we need to have that full discussion.  We discussed the basics of urothelial carcinoma including the differences between muscle invasive and nonmuscle invasive and the rationale for cystectomy if this is isolated to the bladder itself and muscle invasive.  We discussed the approximate 10% benefit of neoadjuvant chemotherapy use prior to cystectomy and the fact that this would be curative intent.  Unfortunately I also do have concerns for possible metastatic versus locally advanced disease based on the indeterminate mass from the CT urogram.  I am recommending a PET CT for further evaluation and staging in order to determine  eligibility for cystectomy.  I discussed that this requires at least 2 weeks for insurance approval and I have requested this to be scheduled exactly 2 weeks out in order to to get the procedure and the read as fast as possible.  Her creatinine at her initial appointment is inadequate for cisplatin consideration, but there are other treatment options.      Finally I discussed that I would be willing to fill a temporary prescription of pain medication which she said Norco 5 mg was effective.  I discussed that I would not be a long-term pain management provider for her and that I do not take over any other controlled substance prescriptions that already existing.  Again I do believe she has a real reason for pain from the hydronephrosis but I again emphasized that this must be intervened on to try to relieve the pain rather than just mask with narcotics.  She does have a note of clonazepam in her chart which she takes for sleep per her report.  She does not take this every night and I did recommend that she avoid taking this when she takes the Norco for the pain due to the additive effects.  I also provided a prescription for Narcan which is to be filled at the same time.  She also has significant neuropathy and is taking gabapentin and I also emphasized the additive effects of gabapentin and Norco as well and that she should be careful and take no more than one 5 mg Norco pill every 6 hours.    At her return visit on 6/26/23, she had two family members join by phone for today's discussion.  We went over the results of her PET including the indeterminate abdominal LN, but no bony disease or disease in the chest.  There is a mass in/around the bladder that is indeterminate.  I have ordered an MRI as recommended by radiology for further evaluation.  We again discussed the need for PNTs and recommendations from multiple physicians for these. We discussed that her pain would not get better without intervention to relieve  the hydronephrosis.  I will again refill her Norco for 2 weeks while I am away, but we will again need to have a conversation regarding pain management moving forward at our next appointment.  Her family members on the call were supportive of PNT placement and moving forward with staging so we can determine a plan.  Unfortunately, it sounds like Abiel does not have a lot of support at home and helps care for a relative as well.  We will need to factor this in as we move forward.  She and her family asked good questions today regarding prognosis and treatment.  At this moment, without final staging I am not able to completely address intent of therapy or timeline.  I am leaning more towards pembrolizumab monotherapy due to GFR, lack of carboplatin availability, and neuropathy that would by complicated/worsened by oxaliplatin regimens.  Neuropathy and possible pulmonary fibrosis also are concerning for the addition of enfortumab, which can worsen both of these symptoms.  We also discussed implications of PNT placement and the impact on her life.  She will not be able to do water aerobics or swim with PNTs due to infection risk.  While I hope PNTs would be temporary, I noted that I cannot guarantee that will be the case. She is also interested in home care nursing to assist with PNT cares, which we will need to look into once they are placed (we can't refer now if not in place I am told).     PLAN:   Return to see me on 7/10 at 9AM.  Labs before.   Please get the MRI to help evaluate the location and size of the tumor (in the bladder only or outside).   Please consider getting the kidney draining tube (PNT) placed.  I would like this done before our next appointment so we can start making treatment decisions for you.   I will place a request for a home care nurse.   I have refilled your Norco for another two weeks.  I think your pain is not going to get better without the tube being placed.      A total of 40  minutes were spent on this patient on the day of the encounter, of which more than 50% of this time was used for counseling and coordination of care.  The patient was given the opportunity to ask multiple questions today, all of which were answered to their satisfaction.      Tk Kirkland MD, PhD   of Medicine   Oncology/BMT/Cellular Therapies

## 2023-06-26 NOTE — PROGRESS NOTES
[facility]: Cancer Care                                                                                          [facility] faxed over MRI orders to [facility]. Patient is requesting an open sided MRI    Cat BAUER, RN   Care Coordinator  [facility]

## 2023-06-26 NOTE — NURSING NOTE
"Oncology Rooming Note    June 26, 2023 8:17 AM   Abiel Ruiz is a 68 year old female who presents for:    Chief Complaint   Patient presents with     Blood Draw     Labs drawn via  by RN in lab.  VS taken      Oncology Clinic Visit     RTN for Bladder Cancer     Initial Vitals: Blood Pressure 99/58   Pulse 61   Temperature 97.8  F (36.6  C) (Oral)   Respiration 16   Weight 78.2 kg (172 lb 4.8 oz)   Oxygen Saturation 97%   Body Mass Index 26.99 kg/m   Estimated body mass index is 26.99 kg/m  as calculated from the following:    Height as of 6/7/23: 1.702 m (5' 7\").    Weight as of this encounter: 78.2 kg (172 lb 4.8 oz). Body surface area is 1.92 meters squared.  Severe Pain (6) Comment: Data Unavailable   No LMP recorded.  Allergies reviewed: Yes  Medications reviewed: Yes    Medications: Medication refills not needed today.  Pharmacy name entered into [facility]: [id number] - [place], [place] - [address]    Clinical concerns: simeon Bolanos MA            "

## 2023-06-26 NOTE — PATIENT INSTRUCTIONS
Abiel,   Here is what we discussed today.   Return to see me on 7/10 at 9AM.  Labs before.   Please get the MRI to help evaluate the location and size of the tumor (in the bladder only or outside).   Please consider getting the kidney draining tube (PNT) placed.  I would like this done before our next appointment so we can start making treatment decisions for you.   I will place a request for a home care nurse.   I have refilled your Norco for another two weeks.  I think your pain is not going to get better without the tube being placed.

## 2023-06-26 NOTE — NURSING NOTE
Chief Complaint   Patient presents with     Blood Draw     Labs drawn via  by RN in lab.  VS taken        Labs collected from venipuncture by RN. Vitals taken. Checked in for appointment(s).    Nichelle Coker RN

## 2023-06-30 ENCOUNTER — NURSE TRIAGE (OUTPATIENT)
Dept: ONCOLOGY | Facility: CLINIC | Age: 68
End: 2023-06-30
Payer: COMMERCIAL

## 2023-06-30 NOTE — TELEPHONE ENCOUNTER
Pt calling to request information of where to call to schedule her MRI. Writer noted from [facility] note on 06/26 that MRI order was sent to [facility]. Writer provided pt with [facility] location phone number and address. Informed pt MRI order was faxed over on 06/26. Pt grateful for help.

## 2023-07-05 DIAGNOSIS — C67.8 MALIGNANT NEOPLASM OF OVERLAPPING SITES OF BLADDER (H): Primary | ICD-10-CM

## 2023-07-05 PROCEDURE — 99207 PR NO CHARGE LOS: CPT | Performed by: STUDENT IN AN ORGANIZED HEALTH CARE EDUCATION/TRAINING PROGRAM

## 2023-07-10 ENCOUNTER — ONCOLOGY VISIT (OUTPATIENT)
Dept: ONCOLOGY | Facility: CLINIC | Age: 68
End: 2023-07-10
Attending: STUDENT IN AN ORGANIZED HEALTH CARE EDUCATION/TRAINING PROGRAM
Payer: COMMERCIAL

## 2023-07-10 ENCOUNTER — APPOINTMENT (OUTPATIENT)
Dept: LAB | Facility: CLINIC | Age: 68
End: 2023-07-10
Attending: STUDENT IN AN ORGANIZED HEALTH CARE EDUCATION/TRAINING PROGRAM
Payer: COMMERCIAL

## 2023-07-10 VITALS
HEART RATE: 71 BPM | DIASTOLIC BLOOD PRESSURE: 74 MMHG | RESPIRATION RATE: 16 BRPM | OXYGEN SATURATION: 94 % | TEMPERATURE: 98.1 F | HEIGHT: 67 IN | SYSTOLIC BLOOD PRESSURE: 144 MMHG | WEIGHT: 173 LBS | BODY MASS INDEX: 27.15 KG/M2

## 2023-07-10 DIAGNOSIS — C67.8 MALIGNANT NEOPLASM OF OVERLAPPING SITES OF BLADDER (H): Primary | ICD-10-CM

## 2023-07-10 DIAGNOSIS — C67.8 MALIGNANT NEOPLASM OF OVERLAPPING SITES OF BLADDER (H): ICD-10-CM

## 2023-07-10 LAB
ALBUMIN SERPL BCG-MCNC: 4.3 G/DL (ref 3.5–5.2)
ALP SERPL-CCNC: 68 U/L (ref 35–104)
ALT SERPL W P-5'-P-CCNC: 14 U/L (ref 0–50)
ANION GAP SERPL CALCULATED.3IONS-SCNC: 11 MMOL/L (ref 7–15)
AST SERPL W P-5'-P-CCNC: 24 U/L (ref 0–45)
BASOPHILS # BLD AUTO: 0 10E3/UL (ref 0–0.2)
BASOPHILS NFR BLD AUTO: 1 %
BILIRUB SERPL-MCNC: 0.4 MG/DL
BUN SERPL-MCNC: 12 MG/DL (ref 8–23)
CALCIUM SERPL-MCNC: 10 MG/DL (ref 8.8–10.2)
CHLORIDE SERPL-SCNC: 105 MMOL/L (ref 98–107)
CREAT SERPL-MCNC: 1.38 MG/DL (ref 0.51–0.95)
DEPRECATED HCO3 PLAS-SCNC: 25 MMOL/L (ref 22–29)
EOSINOPHIL # BLD AUTO: 0.1 10E3/UL (ref 0–0.7)
EOSINOPHIL NFR BLD AUTO: 1 %
ERYTHROCYTE [DISTWIDTH] IN BLOOD BY AUTOMATED COUNT: 13.2 % (ref 10–15)
GFR SERPL CREATININE-BSD FRML MDRD: 41 ML/MIN/1.73M2
GLUCOSE SERPL-MCNC: 99 MG/DL (ref 70–99)
HCT VFR BLD AUTO: 39 % (ref 35–47)
HGB BLD-MCNC: 12.4 G/DL (ref 11.7–15.7)
IMM GRANULOCYTES # BLD: 0 10E3/UL
IMM GRANULOCYTES NFR BLD: 0 %
LYMPHOCYTES # BLD AUTO: 3.6 10E3/UL (ref 0.8–5.3)
LYMPHOCYTES NFR BLD AUTO: 42 %
MAGNESIUM SERPL-MCNC: 1.9 MG/DL (ref 1.7–2.3)
MCH RBC QN AUTO: 30.2 PG (ref 26.5–33)
MCHC RBC AUTO-ENTMCNC: 31.8 G/DL (ref 31.5–36.5)
MCV RBC AUTO: 95 FL (ref 78–100)
MONOCYTES # BLD AUTO: 0.6 10E3/UL (ref 0–1.3)
MONOCYTES NFR BLD AUTO: 7 %
NEUTROPHILS # BLD AUTO: 4.1 10E3/UL (ref 1.6–8.3)
NEUTROPHILS NFR BLD AUTO: 49 %
NRBC # BLD AUTO: 0 10E3/UL
NRBC BLD AUTO-RTO: 0 /100
PHOSPHATE SERPL-MCNC: 3.4 MG/DL (ref 2.5–4.5)
PLATELET # BLD AUTO: 272 10E3/UL (ref 150–450)
POTASSIUM SERPL-SCNC: 4.4 MMOL/L (ref 3.4–5.3)
PROT SERPL-MCNC: 7.6 G/DL (ref 6.4–8.3)
RBC # BLD AUTO: 4.11 10E6/UL (ref 3.8–5.2)
SODIUM SERPL-SCNC: 141 MMOL/L (ref 136–145)
URATE SERPL-MCNC: 7.3 MG/DL (ref 2.4–5.7)
WBC # BLD AUTO: 8.5 10E3/UL (ref 4–11)

## 2023-07-10 PROCEDURE — 80053 COMPREHEN METABOLIC PANEL: CPT | Performed by: STUDENT IN AN ORGANIZED HEALTH CARE EDUCATION/TRAINING PROGRAM

## 2023-07-10 PROCEDURE — 85025 COMPLETE CBC W/AUTO DIFF WBC: CPT | Performed by: STUDENT IN AN ORGANIZED HEALTH CARE EDUCATION/TRAINING PROGRAM

## 2023-07-10 PROCEDURE — G0463 HOSPITAL OUTPT CLINIC VISIT: HCPCS | Performed by: STUDENT IN AN ORGANIZED HEALTH CARE EDUCATION/TRAINING PROGRAM

## 2023-07-10 PROCEDURE — 84550 ASSAY OF BLOOD/URIC ACID: CPT | Performed by: STUDENT IN AN ORGANIZED HEALTH CARE EDUCATION/TRAINING PROGRAM

## 2023-07-10 PROCEDURE — 36415 COLL VENOUS BLD VENIPUNCTURE: CPT | Performed by: STUDENT IN AN ORGANIZED HEALTH CARE EDUCATION/TRAINING PROGRAM

## 2023-07-10 PROCEDURE — 99215 OFFICE O/P EST HI 40 MIN: CPT | Performed by: STUDENT IN AN ORGANIZED HEALTH CARE EDUCATION/TRAINING PROGRAM

## 2023-07-10 PROCEDURE — 84100 ASSAY OF PHOSPHORUS: CPT | Performed by: STUDENT IN AN ORGANIZED HEALTH CARE EDUCATION/TRAINING PROGRAM

## 2023-07-10 PROCEDURE — 83735 ASSAY OF MAGNESIUM: CPT | Performed by: STUDENT IN AN ORGANIZED HEALTH CARE EDUCATION/TRAINING PROGRAM

## 2023-07-10 PROCEDURE — 99207 PR NO BILLABLE SERVICE THIS VISIT: CPT | Performed by: STUDENT IN AN ORGANIZED HEALTH CARE EDUCATION/TRAINING PROGRAM

## 2023-07-10 RX ORDER — HYDROCODONE BITARTRATE AND ACETAMINOPHEN 5; 325 MG/1; MG/1
1 TABLET ORAL EVERY 4 HOURS PRN
Qty: 84 TABLET | Refills: 0 | Status: SHIPPED | OUTPATIENT
Start: 2023-07-10 | End: 2023-07-24

## 2023-07-10 RX ORDER — CLONAZEPAM 1 MG/1
1 TABLET ORAL
COMMUNITY
Start: 2023-06-07

## 2023-07-10 RX ORDER — ALLOPURINOL 100 MG/1
100 TABLET ORAL DAILY
Qty: 30 TABLET | Refills: 4 | Status: SHIPPED | OUTPATIENT
Start: 2023-07-10

## 2023-07-10 ASSESSMENT — PAIN SCALES - GENERAL: PAINLEVEL: SEVERE PAIN (7)

## 2023-07-10 NOTE — LETTER
7/10/2023         RE: Abiel Ruiz  [address]  [facility] [address]        Dear Colleague,    Thank you for referring your patient, Abiel Ruiz, to the [facility]. Please see a copy of my visit note below.      [facility] Medical Oncology Second Opinion Consultation Note       Date of visit: July 10, 2023    INTERVAL HISTORY: abdominal/pelvic pain is markedly worse.  She has been taking 2 tablets of norco every 6 hours since 7/4 due to pain.  Pain coverage runs out prior to her next dose.  She has not been scheduled for PNT, but is willing now.  She does not want this without general anesthesia due to her signifincant anxiety about it.  MR at [facility] has not been done yet.      ONCOLOGY HISTORY:  2/2023-Initial hematuria with clot.    2/17/2023-Pelvic US with note of fibroid.   4/21/2023-Initial urology appt with Dr. Hancock   5/2/2023-CT urogram with note of L sided hydronephrosis and fat density at [facility]. Note of prostate in formal read, which was later edited.  This may be concerning for metastatic disease.    5/10/2023-Cystoscopy with TURBT-L ureteral orifice not visible.  Op report notes dense, non-papillary mass involving majority of the trigone to the neck of the bladder.  Some of the tumor was reported to be removed, but it sounds like significant tumor remained post resection due to the technical difficulty.  Tumor up to 5cm There was a note in the report that there may be a mass (also thought by Dr. Hancock to be metastatic disease).  L PNT was recommended.  Pathology notable for invasive, high grade urothelial carcinoma with muscle invasion. LVI not present.  Block A2 recommended for testing.   6/1/23-Referral to Dr. Rosales at [facility] for consideration of cystectomy.  Recommendation made for med onc for neoadjuvant therapy consideration and for PNT placement again.   6/7/23-Initial med onc appointment.  Will proceed with staging using PET  CT and recommend PNT placement.  IR referral made for L PNT.  Repeat renal US with L hydronephrosis.    6/21/23-PET CT with a few indeterminate pelvic LN and mass involving or near bladder. MR pelvis recommended.  Will order at 6/26/23 appt.   6/26/23-Discussed PET scan results and mass near bladder that was not well-defined/need for MR pelvis.  She prefers open MR which was sent to [facility].  She was more amenable to PNT placement, which I discussed with IR (plan for moderate sedation rather than GA per our conversation due to coordination difficulties).  Return in two weeks with imaging and hopefully after PNT for eval of renal function/pain management.    7/10/23-Discussed with medicine triage regarding admission.  She is on the list for admission for PNT placement.  She does not have help at home and depends on [employer] for transportation, making outpt with moderate sedation not possible.  Plan for MR with sedation as well to complete staging.     GENOMIC STUDIES: None currently.  Recommend Caris testing of bladder tumor. Block A2 preferred.  We have not been able to discuss consent as of yet due to other staging conversations, pain management, and PNT discussions.      TREATMENT HISTORY:   TURBT 5/10/23    GUIDELINES USED: Pending staging.     INTENT OF THERAPY: Pending staging.     CLINICAL TRIALS:  None    ALLERGIES:     MEDS:  Current Outpatient Medications   Medication Instructions    diphenhydrAMINE-acetaminophen (TYLENOL PM)  MG tablet 1 tablet, Oral, AT BEDTIME PRN    gabapentin (NEURONTIN) 600 mg, Oral, 3 TIMES DAILY    HYDROcodone-acetaminophen (NORCO) 5-325 MG tablet 1 tablet, Oral, EVERY 6 HOURS PRN    HYDROcodone-acetaminophen (NORCO) 5-325 MG tablet 1 tablet, Oral, EVERY 6 HOURS PRN    hydrOXYzine (VISTARIL) 25 MG capsule hydroxyzine pamoate 25 mg capsule   TAKE 1 CAP BY MOUTH THREE TIMES DAILY AS NEEDED. CAN TAKE 2 CAPS NIGHTLY AS NEEDED FOR SLEEP    simvastatin (ZOCOR) 20 MG tablet  "simvastatin 20 mg tablet   TAKE 1 TABLET BY MOUTH ONCE A DAY FOR CHOLESTEROL     ROS-Remainder of 14 point ROS reviewed and negative except as in HPI.    PHYSICAL EXAM:  ECOG-PS=1    BP (!) 144/74   Pulse 71   Temp 98.1  F (36.7  C) (Oral)   Resp 16   Ht 1.702 m (5' 7.01\")   Wt 78.5 kg (173 lb)   SpO2 94%   BMI 27.09 kg/m    Exam:  Constitutional: healthy, alert and mild distress  Head: Normocephalic. No masses or lesions.   Neck: Neck supple. No adenopathy grossly.   ENT: ENT exam normal, no neck nodes or sinus tenderness  Cardiovascular: negative, No edema or JVD.  Respiratory: negative, normal WOB on RA, no wheezing or rhonchi   Gastrointestinal: Abdomen soft, mild tenderness, flank tenderness present on L.   : Deferred  Musculoskeletal: extremities normal- no gross deformities noted and normal muscle tone  Skin: no suspicious lesions or rashes on exposed areas of skin   Neurologic: CNII-XII grossly intact, normal speech and mentation.   Psychiatric: mentation appears normal, anxious and worried/tearful.   Hematologic/Lymphatic/Immunologic: no grossly enlarged cervical LN.     LABS AND IMAGES:    CT [facility]rogram from [facility]. Personally reviewed. Clear L sided hydronephrosis.  No overt LAD.  Bladder is obscured by hip prosthesis artifact, although there does appear to be a deep pelvic mass that I cannot identify.      Renal US at [facility] 6/7/23. Notable for L sided hydronephrosis.     PET from [facility] 6/21/23. Personally reviewed. Some indeterminate abdominal LN.  No bone mets or mets in chest.  Hydronephrosis again redemonstrated.  Mass in or near bladder is ill defined.  Recommendation for MR pelvis from radiology.      No prior baseline creatinine available.    Labs from 7/10/23. With continued rise in creatinine to 1.38.  Lytes and LFTs WNL.  CBC WNL.  Uric acid 7.3.     IMPRESSION AND PLAN:    # muscle-invasive urothelial carcinoma, pending staging.   Abiel presents for initial medical oncology " consultation and possible consideration of neoadjuvant chemotherapy for muscle invasive urothelial carcinoma.  She initially had hematuria with clot in February 2023 but it does not seem like she had been evaluated until at least April 2023.  She underwent cystoscopy with TURBT at [facility] 5/10/2023 with note of a significant tumor in the bladder trigone to the bladder neck which sounds like it was unable to be completely removed due to size location and invasiveness.  This was at least 5 cm per the report and pathology was notable for muscle invasive urothelial carcinoma.  CT urogram prior to this visit was notable for a possible mass blocking the left ureterovesical junction, which was also noted to not be visible on cystoscopy and precluded ureteral stent placement.  Notably there was an additional mass potentially posterior to the bladder which was initially interpreted as the prostate by the reading radiologist, but this appears to be another mass.  The etiology of this mass is unclear as she also has a history of endometriosis.  Unfortunately much of the pelvis is obscured by her right hip prosthesis on the CT study.  She saw Dr. Adler on 6/1/2023 for consideration of a cystectomy.    At her initial appointment on 6/7/2023, we discussed the basis for cancer treatment and the distinction between curative and palliative intent.  Unfortunately we do not have all the imaging and staging information we need to have that full discussion.  We discussed the basics of urothelial carcinoma including the differences between muscle invasive and nonmuscle invasive and the rationale for cystectomy if this is isolated to the bladder itself and muscle invasive.  We discussed the approximate 10% benefit of neoadjuvant chemotherapy use prior to cystectomy and the fact that this would be curative intent.  Unfortunately I also do have concerns for possible metastatic versus locally advanced disease based on the  "indeterminate mass from the CT urogram.  I am recommending a PET CT for further evaluation and staging in order to determine eligibility for cystectomy.  I discussed that this requires at least 2 weeks for insurance approval and I have requested this to be scheduled exactly 2 weeks out in order to to get the procedure and the read as fast as possible.  Her creatinine at her initial appointment is inadequate for cisplatin consideration, but there are other treatment options.      At her 7/10/23 appointment, creatinine continues to rise.  PNT has not been placed yet due to complications with scheduling and lack of help at home/monitoring post moderate sedation.  We will plan to admit for PNT placement and post-monitoring along with possible MR pelvis to complete staging.  She will need to coordinate with [employer] for ride to hospital.  We discussed if she has ride difficulties, to contact us and we can assist with coordination, but to not skip the admission if there are transportation issues.  She has had significant increase in pain, we have increased Norco to 5mg Q4H and reiterated the need for control of her hydronephrosis with PNT.  Start allopurinol 100mg for elevated uric acid in context of renal dysfunction.  Once we have PNTs and MR pelvis to complete staging (possible extra-bladder mass noted as \"prostate\" in her initial staging scan).  Home care nursing cannot be requested until PNTs are in place.      PLAN:   We are working on getting you admitted to the hospital for the PNT tube placement.  Please make sure you answer the phone if the University calls about a time.    If you have difficulties getting a ride with metro mobility, please let us know right away so we can help you get a timely ride to the hospital for your admission.   We will try to get an MRI with some anxiety medication as well during your admission.  This may be harder to do due to scheduling, but we can at least try.  Do not delay " your admission to get your [facility] done at [facility].   I will see you back in two weeks to discuss the results and a treatment plan once we see your kidney function and final staging.    I have refilled your Norco and made it more frequent.  You can now take it every 4 hours.  I am hopeful that you will need less pain medications once you have the PNT placed, which will relieve the pressure on your ureter.    Please start allopurinol for your elevated uric acid.      A total of 60 minutes were spent on this patient on the day of the encounter, of which more than 50% of this time was used for counseling and coordination of care.  The patient was given the opportunity to ask multiple questions today, all of which were answered to their satisfaction.      Tk Kirkland MD, PhD   of Medicine   Oncology/BMT/Cellular Therapies

## 2023-07-10 NOTE — NURSING NOTE
"Oncology Rooming Note    July 10, 2023 9:50 AM   Abiel Ruiz is a 68 year old female who presents for:    Chief Complaint   Patient presents with     Blood Draw     Labs collected from venipuncture by RN. Vitals taken. Checked in for appointment(s).      Oncology Clinic Visit     [facility] RETURN - BLADDER CANCER     Initial Vitals: BP (!) 144/74   Pulse 71   Temp 98.1  F (36.7  C) (Oral)   Resp 16   Ht 1.702 m (5' 7.01\")   Wt 78.5 kg (173 lb)   SpO2 94%   BMI 27.09 kg/m   Estimated body mass index is 27.09 kg/m  as calculated from the following:    Height as of this encounter: 1.702 m (5' 7.01\").    Weight as of this encounter: 78.5 kg (173 lb). Body surface area is 1.93 meters squared.  Severe Pain (7) Comment: Data Unavailable   No LMP recorded.  Allergies reviewed: Yes  Medications reviewed: Yes    Medications: Medication refills not needed today.  Pharmacy name entered into [facility]: [id number] - [place], [place] - [address]            "

## 2023-07-10 NOTE — PROGRESS NOTES
Admission request placed for PNT placement.  Pt has no support at home and cannot do same day outpt treatment due to lack of ride.

## 2023-07-10 NOTE — PROGRESS NOTES
Transfer Type: [facility]  Transfer Triage Note    Date of call: 07/10/23  Time of call: 9:40 AM    Current Patient Location:  Home  Current Level of Care: Outpatient    Vitals: Not Available  Diagnosis: Hydronephrosis  Is COVID-19 a concern? No  Reason for requested transfer: Patient has established care here  Further diagnostic work up, management, and consultation for specialized care   Isolation Needs: None    Outside Records: Available  Additional records may be faxed to [phone].    Transfer accepted: Yes  Stability of Patient: Patient is vitally stable, with no critical labs, and will likely remain stable throughout the transfer process  Level of Care Needed: Med Surg  Telemetry Needed:  None  Expected Time of Arrival for Transfer: greater than 24 hours  Arrival Location:  [facility]    Recommendations for Management and Stabilization: Not needed    Additional Comments:   Abiel Ruiz is a 68 year old female with past medical history muscle invasive urothelial carcinoma complicated by urinary obstruction and unilateral hydronephrosis and related pain with recommendations for PNT placement by IR per oncology.  She was recommended to complete PNT placement with IR as outpatient, however, patient has limited support on an outpatient basis and requests general anesthesia for placement of PNT's.  She does have a mild uptrend in creatinine from 1.15 on 6/7/2023 to 1.38 on 7/10/2023.  She is managing pain with Norco 5 mg every 6 hours with some increased use of narcotics to manage pain.  Outpatient oncologist, , requests admission for completion of PNT placement, ideally within the next few days.  We did discuss limited bed availability.  We will attempt for scheduled admission sometime this week, however, unfortunately are not able to guarantee that this will happen.  May consider pursuing admission at another facility with IR  capabilities such as [facility] if we are unable to complete it here.  We will attempt to obtain a MedSur bed without telemetry.  She will need an IR consult and coordination with anesthesia to complete this procedure.    Court Dumont MD

## 2023-07-10 NOTE — NURSING NOTE
Chief Complaint   Patient presents with     Blood Draw     Labs collected from venipuncture by RN. Vitals taken. Checked in for appointment(s).      Mercedes BEAR RN PHN BSN  BMT/Oncology Lab

## 2023-07-11 NOTE — PROGRESS NOTES
[facility] Medical Oncology Second Opinion Consultation Note       Date of visit: July 10, 2023    INTERVAL HISTORY: abdominal/pelvic pain is markedly worse.  She has been taking 2 tablets of norco every 6 hours since 7/4 due to pain.  Pain coverage runs out prior to her next dose.  She has not been scheduled for PNT, but is willing now.  She does not want this without general anesthesia due to her signifincant anxiety about it.  MR at [facility] has not been done yet.      ONCOLOGY HISTORY:  2/2023-Initial hematuria with clot.    2/17/2023-Pelvic US with note of fibroid.   4/21/2023-Initial urology appt with Dr. Hancock   5/2/2023-CT urogram with note of L sided hydronephrosis and fat density at [facility]. Note of prostate in formal read, which was later edited.  This may be concerning for metastatic disease.    5/10/2023-Cystoscopy with TURBT-L ureteral orifice not visible.  Op report notes dense, non-papillary mass involving majority of the trigone to the neck of the bladder.  Some of the tumor was reported to be removed, but it sounds like significant tumor remained post resection due to the technical difficulty.  Tumor up to 5cm There was a note in the report that there may be a mass (also thought by Dr. Hancock to be metastatic disease).  L PNT was recommended.  Pathology notable for invasive, high grade urothelial carcinoma with muscle invasion. LVI not present.  Block A2 recommended for testing.   6/1/23-Referral to Dr. Rosales at [facility] for consideration of cystectomy.  Recommendation made for med onc for neoadjuvant therapy consideration and for PNT placement again.   6/7/23-Initial med onc appointment.  Will proceed with staging using PET CT and recommend PNT placement.  IR referral made for L PNT.  Repeat renal US with L hydronephrosis.    6/21/23-PET CT with a few indeterminate pelvic LN and mass involving or near bladder. MR pelvis recommended.  Will order at 6/26/23 appt.   6/26/23-Discussed PET scan  results and mass near bladder that was not well-defined/need for MR pelvis.  She prefers open MR which was sent to [facility].  She was more amenable to PNT placement, which I discussed with IR (plan for moderate sedation rather than GA per our conversation due to coordination difficulties).  Return in two weeks with imaging and hopefully after PNT for eval of renal function/pain management.    7/10/23-Discussed with medicine triage regarding admission.  She is on the list for admission for PNT placement.  She does not have help at home and [place] on [employer] for transportation, making outpt with moderate sedation not possible.  Plan for MR with sedation as well to complete staging.     GENOMIC STUDIES: None currently.  Recommend Caris testing of bladder tumor. Block A2 preferred.  We have not been able to discuss consent as of yet due to other staging conversations, pain management, and PNT discussions.      TREATMENT HISTORY:   TURBT 5/10/23    GUIDELINES USED: Pending staging.     INTENT OF THERAPY: Pending staging.     CLINICAL TRIALS:  None    ALLERGIES:     MEDS:  Current Outpatient Medications   Medication Instructions     diphenhydrAMINE-acetaminophen (TYLENOL PM)  MG tablet 1 tablet, Oral, AT BEDTIME PRN     gabapentin (NEURONTIN) 600 mg, Oral, 3 TIMES DAILY     HYDROcodone-acetaminophen (NORCO) 5-325 MG tablet 1 tablet, Oral, EVERY 6 HOURS PRN     HYDROcodone-acetaminophen (NORCO) 5-325 MG tablet 1 tablet, Oral, EVERY 6 HOURS PRN     hydrOXYzine (VISTARIL) 25 MG capsule hydroxyzine pamoate 25 mg capsule   TAKE 1 CAP BY MOUTH THREE TIMES DAILY AS NEEDED. CAN TAKE 2 CAPS NIGHTLY AS NEEDED FOR SLEEP     simvastatin (ZOCOR) 20 MG tablet simvastatin 20 mg tablet   TAKE 1 TABLET BY MOUTH ONCE A DAY FOR CHOLESTEROL     ROS-Remainder of 14 point ROS reviewed and negative except as in HPI.    PHYSICAL EXAM:  ECOG-PS=1    BP (!) 144/74   Pulse 71   Temp 98.1  F (36.7  C) (Oral)   Resp 16   Ht 1.702 m (5'  "7.01\")   Wt 78.5 kg (173 lb)   SpO2 94%   BMI 27.09 kg/m    Exam:  Constitutional: healthy, alert and mild distress  Head: Normocephalic. No masses or lesions.   Neck: Neck supple. No adenopathy grossly.   ENT: ENT exam normal, no neck nodes or sinus tenderness  Cardiovascular: negative, No edema or JVD.  Respiratory: negative, normal WOB on RA, no wheezing or rhonchi   Gastrointestinal: Abdomen soft, mild tenderness, flank tenderness present on L.   : Deferred  Musculoskeletal: extremities normal- no gross deformities noted and normal muscle tone  Skin: no suspicious lesions or rashes on exposed areas of skin   Neurologic: CNII-XII grossly intact, normal speech and mentation.   Psychiatric: mentation appears normal, anxious and worried/tearful.   Hematologic/Lymphatic/Immunologic: no grossly enlarged cervical LN.     LABS AND IMAGES:    CT urogram from [facility]. Personally reviewed. Clear L sided hydronephrosis.  No overt LAD.  Bladder is obscured by hip prosthesis artifact, although there does appear to be a deep pelvic mass that I cannot identify.      Renal US at [facility] 6/7/23. Notable for L sided hydronephrosis.     PET from [facility] 6/21/23. Personally reviewed. Some indeterminate abdominal LN.  No bone mets or mets in chest.  Hydronephrosis again redemonstrated.  Mass in or near bladder is ill defined.  Recommendation for MR pelvis from radiology.      No prior baseline creatinine available.    Labs from 7/10/23. With continued rise in creatinine to 1.38.  Lytes and LFTs WNL.  CBC WNL.  Uric acid 7.3.     IMPRESSION AND PLAN:    # muscle-invasive urothelial carcinoma, pending staging.   Abiel presents for initial medical oncology consultation and possible consideration of neoadjuvant chemotherapy for muscle invasive urothelial carcinoma.  She initially had hematuria with clot in February 2023 but it does not seem like she had been evaluated until at least April 2023.  She underwent cystoscopy with " TURBT at [facility] 5/10/2023 with note of a significant tumor in the bladder trigone to the bladder neck which sounds like it was unable to be completely removed due to size location and invasiveness.  This was at least 5 cm per the report and pathology was notable for muscle invasive urothelial carcinoma.  CT urogram prior to this visit was notable for a possible mass blocking the left ureterovesical junction, which was also noted to not be visible on cystoscopy and precluded ureteral stent placement.  Notably there was an additional mass potentially posterior to the bladder which was initially interpreted as the prostate by the reading radiologist, but this appears to be another mass.  The etiology of this mass is unclear as she also has a history of endometriosis.  Unfortunately much of the pelvis is obscured by her right hip prosthesis on the CT study.  She saw Dr. Adler on 6/1/2023 for consideration of a cystectomy.    At her initial appointment on 6/7/2023, we discussed the basis for cancer treatment and the distinction between curative and palliative intent.  Unfortunately we do not have all the imaging and staging information we need to have that full discussion.  We discussed the basics of urothelial carcinoma including the differences between muscle invasive and nonmuscle invasive and the rationale for cystectomy if this is isolated to the bladder itself and muscle invasive.  We discussed the approximate 10% benefit of neoadjuvant chemotherapy use prior to cystectomy and the fact that this would be curative intent.  Unfortunately I also do have concerns for possible metastatic versus locally advanced disease based on the indeterminate mass from the CT urogram.  I am recommending a PET CT for further evaluation and staging in order to determine eligibility for cystectomy.  I discussed that this requires at least 2 weeks for insurance approval and I have requested this to be scheduled exactly 2 weeks  "out in order to to get the procedure and the read as fast as possible.  Her creatinine at her initial appointment is inadequate for cisplatin consideration, but there are other treatment options.      At her 7/10/23 appointment, creatinine continues to rise.  PNT has not been placed yet due to complications with scheduling and lack of help at home/monitoring post moderate sedation.  We will plan to admit for PNT placement and post-monitoring along with possible MR pelvis to complete staging.  She will need to coordinate with [employer] mobility for ride to hospital.  We discussed if she has ride difficulties, to contact us and we can assist with coordination, but to not skip the admission if there are transportation issues.  She has had significant increase in pain, we have increased Norco to 5mg Q4H and reiterated the need for control of her hydronephrosis with PNT.  Start allopurinol 100mg for elevated uric acid in context of renal dysfunction.  Once we have PNTs and MR pelvis to complete staging (possible extra-bladder mass noted as \"prostate\" in her initial staging scan).  Home care nursing cannot be requested until PNTs are in place.      PLAN:   1. We are working on getting you admitted to the hospital for the PNT tube placement.  Please make sure you answer the phone if the [facility] calls about a time.    2. If you have difficulties getting a ride with [employer], please let us know right away so we can help you get a timely ride to the hospital for your admission.   3. We will try to get an MRI with some anxiety medication as well during your admission.  This may be harder to do due to scheduling, but we can at least try.  Do not delay your admission to get your MRI done at [facility].   4. I will see you back in two weeks to discuss the results and a treatment plan once we see your kidney function and final staging.    5. I have refilled your Norco and made it more frequent.  You can now take it every 4 hours.  " I am hopeful that you will need less pain medications once you have the PNT placed, which will relieve the pressure on your ureter.    6. Please start allopurinol for your elevated uric acid.    7.   A total of 60 minutes were spent on this patient on the day of the encounter, of which more than 50% of this time was used for counseling and coordination of care.  The patient was given the opportunity to ask multiple questions today, all of which were answered to their satisfaction.      Tk Kirkland MD, PhD   of Medicine   Oncology/BMT/Cellular Therapies

## 2023-07-11 NOTE — PATIENT INSTRUCTIONS
Abiel,   Here is what we discussed today.   We are working on getting you admitted to the hospital for the PNT tube placement.  Please make sure you answer the phone if the [facility] calls about a time.    If you have difficulties getting a ride with [employer], please let us know right away so we can help you get a timely ride to the hospital for your admission.   We will try to get an MRI with some anxiety medication as well during your admission.  This may be harder to do due to scheduling, but we can at least try.  Do not delay your admission to get your MRI done at [facility].   I will see you back in two weeks to discuss the results and a treatment plan once we see your kidney function and final staging.    I have refilled your Norco and made it more frequent.  You can now take it every 4 hours.  I am hopeful that you will need less pain medications once you have the PNT placed, which will relieve the pressure on your ureter.    Please start allopurinol for your elevated uric acid.

## 2023-07-12 ENCOUNTER — TRANSFERRED RECORDS (OUTPATIENT)
Dept: HEALTH INFORMATION MANAGEMENT | Facility: CLINIC | Age: 68
End: 2023-07-12
Payer: COMMERCIAL

## 2023-07-14 ENCOUNTER — PATIENT OUTREACH (OUTPATIENT)
Dept: CARE COORDINATION | Facility: CLINIC | Age: 68
End: 2023-07-14

## 2023-07-14 ENCOUNTER — VIRTUAL VISIT (OUTPATIENT)
Dept: ONCOLOGY | Facility: CLINIC | Age: 68
End: 2023-07-14
Attending: STUDENT IN AN ORGANIZED HEALTH CARE EDUCATION/TRAINING PROGRAM
Payer: COMMERCIAL

## 2023-07-14 VITALS — WEIGHT: 173 LBS | HEIGHT: 67 IN | BODY MASS INDEX: 27.15 KG/M2

## 2023-07-14 DIAGNOSIS — C67.8 MALIGNANT NEOPLASM OF OVERLAPPING SITES OF BLADDER (H): Primary | ICD-10-CM

## 2023-07-14 DIAGNOSIS — Z96.651 STATUS POST TOTAL RIGHT KNEE REPLACEMENT: ICD-10-CM

## 2023-07-14 PROCEDURE — 99215 OFFICE O/P EST HI 40 MIN: CPT | Mod: GT

## 2023-07-14 ASSESSMENT — PAIN SCALES - GENERAL: PAINLEVEL: SEVERE PAIN (6)

## 2023-07-14 NOTE — PROGRESS NOTES
Virtual Visit Details    Type of service:  Video Visit   Video Start Time: 11:35 AM  Video End Time:12:04    Originating Location (pt. Location): Home  Distant Location (provider location):  On-site  Platform used for Video Visit: [facility] Medical Oncology Second Opinion Consultation Note       Date of visit: Jul 14, 2023    INTERVAL HISTORY: Continues to have L flank pain that radiates down to the LLQ. Has increased norco 5-325 mg every 4 hours this has been helpful compared to when she was taking norco every 6 hours. Reports that if she waits longer than 4 hours the pain increases and she feels she gets behind on the pain. When this happens, the pain can increase to 10/10 severe pain. When she stays on top of her norco, the pain is improved to ~4.5-5/10. No blood in the urine that she can see. Endorses occasional chills. Has not measured her temperature. She was called for the planned admission for PNT placement on 07/12/2023. She had her MR at [email or web address] performed yesterday.     Patient spoke to her uncle in [place] who she reports stated that he could come stay with her sister (Abiel is her main caretaker after a stroke) as this is a barrier to being admitted for PNT placement. Patient wants general anesthesia and wishes to be admitted for the procedure. She also relies on [employer] for transportation.      ROS: 12 point ROS neg other than the symptoms noted above in the HPI.    ONCOLOGY HISTORY:  2/2023-Initial hematuria with clot.    2/17/2023-Pelvic US with note of fibroid.   4/21/2023-Initial urology appt with Dr. Hancock   5/2/2023-CT urogram with note of L sided hydronephrosis and fat density at [facility]. Note of prostate in formal read, which was later edited.  This may be concerning for metastatic disease.    5/10/2023-Cystoscopy with TURBT-L ureteral orifice not visible.  Op report notes dense, non-papillary mass involving majority of the trigone to the neck of the bladder.  Some  of the tumor was reported to be removed, but it sounds like significant tumor remained post resection due to the technical difficulty.  Tumor up to 5cm There was a note in the report that there may be a mass (also thought by Dr. Hancock to be metastatic disease).  L PNT was recommended.  Pathology notable for invasive, high grade urothelial carcinoma with muscle invasion. LVI not present.  Block A2 recommended for testing.   6/1/23-Referral to Dr. Rosales at [facility] for consideration of cystectomy.  Recommendation made for med onc for neoadjuvant therapy consideration and for PNT placement again.   6/7/23-Initial med onc appointment.  Will proceed with staging using PET CT and recommend PNT placement.  IR referral made for L PNT.  Repeat renal US with L hydronephrosis.    6/21/23-PET CT with a few indeterminate pelvic LN and mass involving or near bladder. MR pelvis recommended.  Will order at 6/26/23 appt.   6/26/23-Discussed PET scan results and mass near bladder that was not well-defined/need for MR pelvis.  She prefers open MR which was sent to [facility].  She was more amenable to PNT placement, which I discussed with IR (plan for moderate sedation rather than GA per our conversation due to coordination difficulties).  Return in two weeks with imaging and hopefully after PNT for eval of renal function/pain management.    7/10/23-Discussed with medicine triage regarding admission.  She is on the list for admission for PNT placement.  She does not have help at home and depends on [employer] for transportation, making outpt with moderate sedation not possible.  Plan for MR with sedation as well to complete staging.     GENOMIC STUDIES: None currently.  Recommend Caris testing of bladder tumor. Block A2 preferred.  We have not been able to discuss consent as of yet due to other staging conversations, pain management, and PNT discussions.      TREATMENT HISTORY:   TURBT 5/10/23    GUIDELINES USED: Pending staging.      INTENT OF THERAPY: Pending staging.     CLINICAL TRIALS:  None    ALLERGIES:     MEDS:  Current Outpatient Medications   Medication Instructions     diphenhydrAMINE-acetaminophen (TYLENOL PM)  MG tablet 1 tablet, Oral, AT BEDTIME PRN     gabapentin (NEURONTIN) 600 mg, Oral, 3 TIMES DAILY     HYDROcodone-acetaminophen (NORCO) 5-325 MG tablet 1 tablet, Oral, EVERY 6 HOURS PRN     HYDROcodone-acetaminophen (NORCO) 5-325 MG tablet 1 tablet, Oral, EVERY 6 HOURS PRN     hydrOXYzine (VISTARIL) 25 MG capsule hydroxyzine pamoate 25 mg capsule   TAKE 1 CAP BY MOUTH THREE TIMES DAILY AS NEEDED. CAN TAKE 2 CAPS NIGHTLY AS NEEDED FOR SLEEP     simvastatin (ZOCOR) 20 MG tablet simvastatin 20 mg tablet   TAKE 1 TABLET BY MOUTH ONCE A DAY FOR CHOLESTEROL     ROS-Remainder of 14 point ROS reviewed and negative except as in HPI.    PHYSICAL EXAM:  ECOG-PS=1  Video physical exam  General: Patient appears well in no acute distress.   Skin: No visualized rash or lesions on visualized skin  Eyes: EOMI, no erythema, sclera icterus or discharge noted  Resp: Appears to be breathing comfortably without accessory muscle usage, speaking in full sentences, no cough  MSK: Appears to have normal range of motion based on visualized movements  Neurologic: No apparent tremors, facial movements symmetric  Psych: affect blunted, alert and oriented    LABS AND IMAGES:    CT urogram from [facility]. Personally reviewed. Clear L sided hydronephrosis.  No overt LAD.  Bladder is obscured by hip prosthesis artifact.     Renal US at [facility] 6/7/23. Notable for L sided hydronephrosis.     PET from [facility] 6/21/23. Personally reviewed. Some indeterminate abdominal LN.  No bone mets or mets in chest.  Hydronephrosis again redemonstrated.  Mass in or near bladder is ill defined.  Recommendation for MR pelvis from radiology.      No prior baseline creatinine available.    Labs from 7/10/23. With continued rise in creatinine to 1.38.  Lytes and LFTs WNL.   CBC WNL.  Uric acid 7.3.     IMPRESSION AND PLAN:    # muscle-invasive urothelial carcinoma, pending staging.   Presented to [facility] in April 2023 with hematuria ongoing since February 2023. She underwent cystoscopy with TURBT at [facility] 5/10/2023 with note of a significant tumor in the bladder trigone to the bladder neck which sounds like it was unable to be completely removed due to size location and invasiveness.    - CT urogram was notable for a possible mass blocking the left ureterovesical junction, which was also noted to not be visible on cystoscopy and precluded ureteral stent placement.  Notably there was an additional mass potentially posterior to the bladder which was initially interpreted as the prostate (patient is female) by the reading radiologist, but this appears to be another mass. The etiology of this mass is unclear as she also has a history of endometriosis.  Unfortunately much of the pelvis is obscured by her right hip prosthesis on the CT study. Recommendation was to proceed with an MR of the pelvis which she had done yesterday at [facility], I will request these records today. Final treatment plans pending completed staging.       #L sided hydronephrosis   - Several barriers to admission for PNT tube placement. At her 7/10/23 appointment, creatinine continues to rise.  PNT has not been placed yet due to complications with scheduling and lack of help at home/monitoring post moderate sedation.  We will plan to admit for PNT placement and post-monitoring.   - she usually utilizes [employer] for hospital rides. Social work referral placed today to help aid in additional resources to help overcome barriers.   - She has L sided flank pain. Utilizing Norco 5mg Q4H. Pain is improved with increased Norco scheduling. We discussed that our hope is that pain also improves with PNT placement. I explained in detail the urinary system along with why an elevated creatinine limits certain  possible chemotherapy options.   - Continue allopurinol 100 mg for elevated uric acid in the setting of renal dysfunction.   - Home care nursing cannot be requested until PNTs are in place.      #Psychosocial   - several barriers to care including transportation and caretaker to her sister.   - patient is trying to arrange her uncle coming to help take care of her sister while PNT tubes are placed.     #Plan  - cancel admission for today (patient declines due to transportation issues)  - reschedule admission for PNT tubes to early next week on 07/17 or 7/18.   - social work referral.  - follow up with Dr. Kirkland on 07/24/23.     45 minutes spent on the date of the encounter doing chart review, review of test results, patient visit and documentation     Cherri Uriostegui PA-C

## 2023-07-14 NOTE — Clinical Note
"    7/14/2023         RE: Abiel Ruiz  [address]  [facility] [address]        Dear Colleague,    Thank you for referring your patient, Abiel Ruiz, to the [facility]. Please see a copy of my visit note below.    Virtual Visit Details    Type of service:  Video Visit   Video Start Time: {video visit start/end time for provider to select:632470}  Video End Time:{video visit start/end time for provider to select:636916}    Originating Location (pt. Location): {video visit patient location:632811::\"Home\"}  {PROVIDER LOCATION On-site should be selected for visits conducted from your clinic location or adjoining [facility] hospital, academic office, or other nearby [facility] building. Off-site should be selected for all other provider locations, including home:271313}  Distant Location (provider location):  {virtual location provider:516209}  Platform used for Video Visit: {Virtual Visit Platforms:766808::\"AmWell\"}    Virtual Visit Details    Type of service:  Video Visit   Video Start Time: 11:35 AM  Video End Time:{video visit start/end time for provider to select:384435}    Originating Location (pt. Location): {video visit patient location:623556::\"Home\"}  {PROVIDER LOCATION On-site should be selected for visits conducted from your clinic location or adjoining [facility] hospital, academic office, or other nearby [facility] building. Off-site should be selected for all other provider locations, including home:853837}  Distant Location (provider location):  {virtual location provider:070268}  Platform used for Video Visit: {Virtual Visit Platforms:519088::\"AmWell\"}       Critical access hospital Medical Oncology Second Opinion Consultation Note       Date of visit: Jul 14, 2023    INTERVAL HISTORY: Continues to have L flank pain that radiates down to the LLQ. Has increased norco 5-325 mg every 4 hours. If waits longer than 4 hours the pain increases. Rates pain at a 4.5-5/10. It will reach " 10/10 severe pain. No blood in the urine that she can see. Endorses chills- putting on her winter jacket at times. Has not measured her temperature. Was told yesterday the earliest date would be July 28th.      abdominal/pelvic pain is markedly worse.  She has been taking 2 tablets of norco every 6 hours since 7/4 due to pain.  Pain coverage runs out prior to her next dose.  She has not been scheduled for PNT, but is willing now.  She does not want this without general anesthesia due to her signifincant anxiety about it.  MR at [facility] has not been done yet.      ONCOLOGY HISTORY:  2/2023-Initial hematuria with clot.    2/17/2023-Pelvic US with note of fibroid.   4/21/2023-Initial urology appt with Dr. Hancock   5/2/2023-CT urogram with note of L sided hydronephrosis and fat density at [facility]. Note of prostate in formal read, which was later edited.  This may be concerning for metastatic disease.    5/10/2023-Cystoscopy with TURBT-L ureteral orifice not visible.  Op report notes dense, non-papillary mass involving majority of the trigone to the neck of the bladder.  Some of the tumor was reported to be removed, but it sounds like significant tumor remained post resection due to the technical difficulty.  Tumor up to 5cm There was a note in the report that there may be a mass (also thought by Dr. Hancock to be metastatic disease).  L PNT was recommended.  Pathology notable for invasive, high grade urothelial carcinoma with muscle invasion. LVI not present.  Block A2 recommended for testing.   6/1/23-Referral to Dr. Rosales at [facility] for consideration of cystectomy.  Recommendation made for med onc for neoadjuvant therapy consideration and for PNT placement again.   6/7/23-Initial med onc appointment.  Will proceed with staging using PET CT and recommend PNT placement.  IR referral made for L PNT.  Repeat renal US with L hydronephrosis.    6/21/23-PET CT with a few indeterminate pelvic LN and mass involving or near  bladder. MR pelvis recommended.  Will order at 6/26/23 appt.   6/26/23-Discussed PET scan results and mass near bladder that was not well-defined/need for MR pelvis.  She prefers open MR which was sent to [facility].  She was more amenable to PNT placement, which I discussed with IR (plan for moderate sedation rather than GA per our conversation due to coordination difficulties).  Return in two weeks with imaging and hopefully after PNT for eval of renal function/pain management.    7/10/23-Discussed with medicine triage regarding admission.  She is on the list for admission for PNT placement.  She does not have help at home and depends on [employer] for transportation, making outpt with moderate sedation not possible.  Plan for MR with sedation as well to complete staging.     GENOMIC STUDIES: None currently.  Recommend Caris testing of bladder tumor. Block A2 preferred.  We have not been able to discuss consent as of yet due to other staging conversations, pain management, and PNT discussions.      TREATMENT HISTORY:   TURBT 5/10/23    GUIDELINES USED: Pending staging.     INTENT OF THERAPY: Pending staging.     CLINICAL TRIALS:  None    ALLERGIES:     MEDS:  Current Outpatient Medications   Medication Instructions     diphenhydrAMINE-acetaminophen (TYLENOL PM)  MG tablet 1 tablet, Oral, AT BEDTIME PRN     gabapentin (NEURONTIN) 600 mg, Oral, 3 TIMES DAILY     HYDROcodone-acetaminophen (NORCO) 5-325 MG tablet 1 tablet, Oral, EVERY 6 HOURS PRN     HYDROcodone-acetaminophen (NORCO) 5-325 MG tablet 1 tablet, Oral, EVERY 6 HOURS PRN     hydrOXYzine (VISTARIL) 25 MG capsule hydroxyzine pamoate 25 mg capsule   TAKE 1 CAP BY MOUTH THREE TIMES DAILY AS NEEDED. CAN TAKE 2 CAPS NIGHTLY AS NEEDED FOR SLEEP     simvastatin (ZOCOR) 20 MG tablet simvastatin 20 mg tablet   TAKE 1 TABLET BY MOUTH ONCE A DAY FOR CHOLESTEROL     ROS-Remainder of 14 point ROS reviewed and negative except as in HPI.    PHYSICAL  "EXAM:  ECOG-PS=1    Ht 1.702 m (5' 7\")   Wt 78.5 kg (173 lb)   BMI 27.10 kg/m    Exam:  Constitutional: healthy, alert and mild distress  Head: Normocephalic. No masses or lesions.   Neck: Neck supple. No adenopathy grossly.   ENT: ENT exam normal, no neck nodes or sinus tenderness  Cardiovascular: negative, No edema or JVD.  Respiratory: negative, normal WOB on RA, no wheezing or rhonchi   Gastrointestinal: Abdomen soft, mild tenderness, flank tenderness present on L.   : Deferred  Musculoskeletal: extremities normal- no gross deformities noted and normal muscle tone  Skin: no suspicious lesions or rashes on exposed areas of skin   Neurologic: CNII-XII grossly intact, normal speech and mentation.   Psychiatric: mentation appears normal, anxious and worried/tearful.   Hematologic/Lymphatic/Immunologic: no grossly enlarged cervical LN.     LABS AND IMAGES:    CT urogram from [facility]. Personally reviewed. Clear L sided hydronephrosis.  No overt LAD.  Bladder is obscured by hip prosthesis artifact, although there does appear to be a deep pelvic mass that I cannot identify.      Renal US at [facility] 6/7/23. Notable for L sided hydronephrosis.     PET from [facility] 6/21/23. Personally reviewed. Some indeterminate abdominal LN.  No bone mets or mets in chest.  Hydronephrosis again redemonstrated.  Mass in or near bladder is ill defined.  Recommendation for MR pelvis from radiology.      No prior baseline creatinine available.    Labs from 7/10/23. With continued rise in creatinine to 1.38.  Lytes and LFTs WNL.  CBC WNL.  Uric acid 7.3.     IMPRESSION AND PLAN:    # muscle-invasive urothelial carcinoma, pending staging.   Abiel presents for initial medical oncology consultation and possible consideration of neoadjuvant chemotherapy for muscle invasive urothelial carcinoma.  She initially had hematuria with clot in February 2023 but it does not seem like she had been evaluated until at least April 2023.  She underwent " cystoscopy with TURBT at [facility] 5/10/2023 with note of a significant tumor in the bladder trigone to the bladder neck which sounds like it was unable to be completely removed due to size location and invasiveness.  This was at least 5 cm per the report and pathology was notable for muscle invasive urothelial carcinoma.  CT urogram prior to this visit was notable for a possible mass blocking the left ureterovesical junction, which was also noted to not be visible on cystoscopy and precluded ureteral stent placement.  Notably there was an additional mass potentially posterior to the bladder which was initially interpreted as the prostate by the reading radiologist, but this appears to be another mass.  The etiology of this mass is unclear as she also has a history of endometriosis.  Unfortunately much of the pelvis is obscured by her right hip prosthesis on the CT study.  She saw Dr. Adler on 6/1/2023 for consideration of a cystectomy.    At her initial appointment on 6/7/2023, we discussed the basis for cancer treatment and the distinction between curative and palliative intent.  Unfortunately we do not have all the imaging and staging information we need to have that full discussion.  We discussed the basics of urothelial carcinoma including the differences between muscle invasive and nonmuscle invasive and the rationale for cystectomy if this is isolated to the bladder itself and muscle invasive.  We discussed the approximate 10% benefit of neoadjuvant chemotherapy use prior to cystectomy and the fact that this would be curative intent.  Unfortunately I also do have concerns for possible metastatic versus locally advanced disease based on the indeterminate mass from the CT urogram.  I am recommending a PET CT for further evaluation and staging in order to determine eligibility for cystectomy.  I discussed that this requires at least 2 weeks for insurance approval and I have requested this to be scheduled  "exactly 2 weeks out in order to to get the procedure and the read as fast as possible.  Her creatinine at her initial appointment is inadequate for cisplatin consideration, but there are other treatment options.      At her 7/10/23 appointment, creatinine continues to rise.  PNT has not been placed yet due to complications with scheduling and lack of help at home/monitoring post moderate sedation.  We will plan to admit for PNT placement and post-monitoring along with possible MR pelvis to complete staging.  She will need to coordinate with [employer] for ride to hospital.  We discussed if she has ride difficulties, to contact us and we can assist with coordination, but to not skip the admission if there are transportation issues.  She has had significant increase in pain, we have increased Norco to 5mg Q4H and reiterated the need for control of her hydronephrosis with PNT.  Start allopurinol 100mg for elevated uric acid in context of renal dysfunction.  Once we have PNTs and MR pelvis to complete staging (possible extra-bladder mass noted as \"prostate\" in her initial staging scan).  Home care nursing cannot be requested until PNTs are in place.      PLAN:   1. We are working on getting you admitted to the hospital for the PNT tube placement.  Please make sure you answer the phone if the [facility] calls about a time.    2. If you have difficulties getting a ride with [employer], please let us know right away so we can help you get a timely ride to the hospital for your admission.   3. We will try to get an MRI with some anxiety medication as well during your admission.  This may be harder to do due to scheduling, but we can at least try.  Do not delay your admission to get your MRI done at [facility].   4. I will see you back in two weeks to discuss the results and a treatment plan once we see your kidney function and final staging.    5. I have refilled your Norco and made it more frequent.  You can now take it " every 4 hours.  I am hopeful that you will need less pain medications once you have the PNT placed, which will relieve the pressure on your ureter.    6. Please start allopurinol for your elevated uric acid.        Has a family member here from [place] to help with her sister.         Again, thank you for allowing me to participate in the care of your patient.        Sincerely,        Cherri Uriostegui PA-C

## 2023-07-17 ENCOUNTER — PATIENT OUTREACH (OUTPATIENT)
Dept: ONCOLOGY | Facility: CLINIC | Age: 68
End: 2023-07-17
Payer: COMMERCIAL

## 2023-07-17 NOTE — PROGRESS NOTES
[facility]: Cancer Care                                                                                          Received call from charge nurse on 7D that patient is not planning to come for direct admission today.     Called patient to discuss reason for not coming for direct admission. She states that her cousin is not in town yet and she will not be in the hospital without them here. Discussed the importance of admission for PNT placement. We are unable to start treatment for her cancer until these have been placed. She continues to refuse admission today and wants it for tomorrow. Explained that we can not guarnentee a bed will be available for tomorrow but we will place the request.     Returned call to [facility], they agree to accept patient for direct admit tomorrow. Call placed to bed placement. Patient updated with plan.     Angelica Marie MSN, RN ,OCN  Lead RN Care Coordinator - [facility]  [phone]

## 2023-07-18 ENCOUNTER — HOSPITAL ENCOUNTER (OUTPATIENT)
Facility: CLINIC | Age: 68
Setting detail: OBSERVATION
Discharge: HOME | End: 2023-07-20
Attending: STUDENT IN AN ORGANIZED HEALTH CARE EDUCATION/TRAINING PROGRAM | Admitting: HOSPITALIST
Payer: COMMERCIAL

## 2023-07-18 DIAGNOSIS — C68.9 UROTHELIAL CARCINOMA (H): Primary | ICD-10-CM

## 2023-07-18 DIAGNOSIS — N32.0: ICD-10-CM

## 2023-07-18 LAB
ANION GAP SERPL CALCULATED.3IONS-SCNC: 13 MMOL/L (ref 7–15)
BUN SERPL-MCNC: 13.3 MG/DL (ref 8–23)
CALCIUM SERPL-MCNC: 9.7 MG/DL (ref 8.8–10.2)
CHLORIDE SERPL-SCNC: 105 MMOL/L (ref 98–107)
CREAT SERPL-MCNC: 1.4 MG/DL (ref 0.51–0.95)
DEPRECATED HCO3 PLAS-SCNC: 22 MMOL/L (ref 22–29)
ERYTHROCYTE [DISTWIDTH] IN BLOOD BY AUTOMATED COUNT: 13 % (ref 10–15)
GFR SERPL CREATININE-BSD FRML MDRD: 41 ML/MIN/1.73M2
GLUCOSE SERPL-MCNC: 87 MG/DL (ref 70–99)
HCT VFR BLD AUTO: 38.4 % (ref 35–47)
HGB BLD-MCNC: 11.9 G/DL (ref 11.7–15.7)
INR PPP: 1.13 (ref 0.85–1.15)
MCH RBC QN AUTO: 30.1 PG (ref 26.5–33)
MCHC RBC AUTO-ENTMCNC: 31 G/DL (ref 31.5–36.5)
MCV RBC AUTO: 97 FL (ref 78–100)
PLATELET # BLD AUTO: 306 10E3/UL (ref 150–450)
POTASSIUM SERPL-SCNC: 3.9 MMOL/L (ref 3.4–5.3)
RBC # BLD AUTO: 3.96 10E6/UL (ref 3.8–5.2)
SODIUM SERPL-SCNC: 140 MMOL/L (ref 136–145)
WBC # BLD AUTO: 7.7 10E3/UL (ref 4–11)

## 2023-07-18 PROCEDURE — 99222 1ST HOSP IP/OBS MODERATE 55: CPT | Mod: GC | Performed by: HOSPITALIST

## 2023-07-18 PROCEDURE — 250N000013 HC RX MED GY IP 250 OP 250 PS 637

## 2023-07-18 PROCEDURE — 36415 COLL VENOUS BLD VENIPUNCTURE: CPT

## 2023-07-18 PROCEDURE — 99223 1ST HOSP IP/OBS HIGH 75: CPT | Performed by: STUDENT IN AN ORGANIZED HEALTH CARE EDUCATION/TRAINING PROGRAM

## 2023-07-18 PROCEDURE — 120N000002 HC R&B MED SURG/OB UMMC

## 2023-07-18 PROCEDURE — 250N000013 HC RX MED GY IP 250 OP 250 PS 637: Performed by: HOSPITALIST

## 2023-07-18 PROCEDURE — 85610 PROTHROMBIN TIME: CPT

## 2023-07-18 PROCEDURE — 85027 COMPLETE CBC AUTOMATED: CPT

## 2023-07-18 PROCEDURE — 80048 BASIC METABOLIC PNL TOTAL CA: CPT

## 2023-07-18 PROCEDURE — 999N000128 HC STATISTIC PERIPHERAL IV START W/O US GUIDANCE

## 2023-07-18 RX ORDER — CLONAZEPAM 0.5 MG/1
1 TABLET ORAL EVERY 24 HOURS
Status: DISCONTINUED | OUTPATIENT
Start: 2023-07-18 | End: 2023-07-20 | Stop reason: HOSPADM

## 2023-07-18 RX ORDER — SIMVASTATIN 10 MG
20 TABLET ORAL AT BEDTIME
Status: DISCONTINUED | OUTPATIENT
Start: 2023-07-18 | End: 2023-07-20 | Stop reason: HOSPADM

## 2023-07-18 RX ORDER — DOCUSATE SODIUM 100 MG/1
100 CAPSULE, LIQUID FILLED ORAL 2 TIMES DAILY
Status: DISCONTINUED | OUTPATIENT
Start: 2023-07-18 | End: 2023-07-20 | Stop reason: HOSPADM

## 2023-07-18 RX ORDER — HYDROCODONE BITARTRATE AND ACETAMINOPHEN 5; 325 MG/1; MG/1
1 TABLET ORAL EVERY 4 HOURS PRN
Status: DISCONTINUED | OUTPATIENT
Start: 2023-07-18 | End: 2023-07-18

## 2023-07-18 RX ORDER — GABAPENTIN 600 MG/1
600 TABLET ORAL 3 TIMES DAILY
Status: DISCONTINUED | OUTPATIENT
Start: 2023-07-18 | End: 2023-07-18

## 2023-07-18 RX ORDER — LORAZEPAM 0.5 MG/1
0.5 TABLET ORAL
Status: DISCONTINUED | OUTPATIENT
Start: 2023-07-18 | End: 2023-07-20 | Stop reason: HOSPADM

## 2023-07-18 RX ORDER — NALOXONE HYDROCHLORIDE 0.4 MG/ML
0.4 INJECTION, SOLUTION INTRAMUSCULAR; INTRAVENOUS; SUBCUTANEOUS
Status: DISCONTINUED | OUTPATIENT
Start: 2023-07-18 | End: 2023-07-20 | Stop reason: HOSPADM

## 2023-07-18 RX ORDER — LIDOCAINE 40 MG/G
CREAM TOPICAL
Status: DISCONTINUED | OUTPATIENT
Start: 2023-07-18 | End: 2023-07-20 | Stop reason: HOSPADM

## 2023-07-18 RX ORDER — NALOXONE HYDROCHLORIDE 0.4 MG/ML
0.2 INJECTION, SOLUTION INTRAMUSCULAR; INTRAVENOUS; SUBCUTANEOUS
Status: DISCONTINUED | OUTPATIENT
Start: 2023-07-18 | End: 2023-07-20 | Stop reason: HOSPADM

## 2023-07-18 RX ORDER — COLCHICINE 0.6 MG/1
1.2 TABLET ORAL DAILY PRN
Status: DISCONTINUED | OUTPATIENT
Start: 2023-07-18 | End: 2023-07-20 | Stop reason: HOSPADM

## 2023-07-18 RX ORDER — HYDROXYZINE HYDROCHLORIDE 25 MG/1
25 TABLET, FILM COATED ORAL 3 TIMES DAILY PRN
Status: DISCONTINUED | OUTPATIENT
Start: 2023-07-18 | End: 2023-07-20 | Stop reason: HOSPADM

## 2023-07-18 RX ORDER — HYDROMORPHONE HYDROCHLORIDE 2 MG/1
2 TABLET ORAL ONCE
Status: COMPLETED | OUTPATIENT
Start: 2023-07-18 | End: 2023-07-18

## 2023-07-18 RX ORDER — OXYCODONE HYDROCHLORIDE 5 MG/1
5 TABLET ORAL EVERY 4 HOURS PRN
Status: DISCONTINUED | OUTPATIENT
Start: 2023-07-18 | End: 2023-07-19

## 2023-07-18 RX ORDER — HYDROCODONE BITARTRATE AND ACETAMINOPHEN 5; 325 MG/1; MG/1
TABLET ORAL
Status: COMPLETED
Start: 2023-07-18 | End: 2023-07-18

## 2023-07-18 RX ORDER — GABAPENTIN 300 MG/1
300 CAPSULE ORAL 3 TIMES DAILY
Status: DISCONTINUED | OUTPATIENT
Start: 2023-07-18 | End: 2023-07-20 | Stop reason: HOSPADM

## 2023-07-18 RX ORDER — ALLOPURINOL 100 MG/1
100 TABLET ORAL DAILY
Status: DISCONTINUED | OUTPATIENT
Start: 2023-07-18 | End: 2023-07-20 | Stop reason: HOSPADM

## 2023-07-18 RX ADMIN — DOCUSATE SODIUM 100 MG: 100 CAPSULE, LIQUID FILLED ORAL at 11:39

## 2023-07-18 RX ADMIN — OXYCODONE HYDROCHLORIDE 5 MG: 5 TABLET ORAL at 16:14

## 2023-07-18 RX ADMIN — SIMVASTATIN 20 MG: 10 TABLET, FILM COATED ORAL at 21:58

## 2023-07-18 RX ADMIN — DOCUSATE SODIUM 100 MG: 100 CAPSULE, LIQUID FILLED ORAL at 20:13

## 2023-07-18 RX ADMIN — HYDROCODONE BITARTRATE AND ACETAMINOPHEN 1 TABLET: 5; 325 TABLET ORAL at 11:24

## 2023-07-18 RX ADMIN — HYDROMORPHONE HYDROCHLORIDE 2 MG: 2 TABLET ORAL at 22:52

## 2023-07-18 RX ADMIN — ALLOPURINOL 100 MG: 100 TABLET ORAL at 11:39

## 2023-07-18 RX ADMIN — OXYCODONE HYDROCHLORIDE 5 MG: 5 TABLET ORAL at 20:13

## 2023-07-18 RX ADMIN — GABAPENTIN 300 MG: 300 CAPSULE ORAL at 14:22

## 2023-07-18 RX ADMIN — GABAPENTIN 300 MG: 300 CAPSULE ORAL at 20:13

## 2023-07-18 RX ADMIN — CLONAZEPAM 1 MG: 0.5 TABLET ORAL at 14:22

## 2023-07-18 ASSESSMENT — ACTIVITIES OF DAILY LIVING (ADL)
ADLS_ACUITY_SCORE: 20
TOILETING_ISSUES: NO
DOING_ERRANDS_INDEPENDENTLY_DIFFICULTY: NO
DIFFICULTY_EATING/SWALLOWING: NO
ADLS_ACUITY_SCORE: 20
CONCENTRATING,_REMEMBERING_OR_MAKING_DECISIONS_DIFFICULTY: NO
ADLS_ACUITY_SCORE: 20
CHANGE_IN_FUNCTIONAL_STATUS_SINCE_ONSET_OF_CURRENT_ILLNESS/INJURY: NO
WEAR_GLASSES_OR_BLIND: YES
ADLS_ACUITY_SCORE: 35
ADLS_ACUITY_SCORE: 20
FALL_HISTORY_WITHIN_LAST_SIX_MONTHS: NO
VISION_MANAGEMENT: GLASSES
ADLS_ACUITY_SCORE: 20
ADLS_ACUITY_SCORE: 20
HEARING_DIFFICULTY_OR_DEAF: NO
EQUIPMENT_CURRENTLY_USED_AT_HOME: CANE, STRAIGHT
WALKING_OR_CLIMBING_STAIRS_DIFFICULTY: NO
ADLS_ACUITY_SCORE: 20
DRESSING/BATHING_DIFFICULTY: NO
DIFFICULTY_COMMUNICATING: NO

## 2023-07-18 NOTE — PLAN OF CARE
Nursing Focus: Admission    D: Patient admitted/transferred from home via own driving for urinary obstruction and unilateral hydronephrosis and related pain with recommendations for PNT placement by IR per oncology  I: Upon arrival to the unit patient was oriented to room, unit, and call light. Patient s height, weight, and vital signs were obtained. Allergies reviewed and allergy band applied. MD notified of patient s arrival on the unit. Adult AVS completed. Head to toe assessment completed. Education assessment completed. Care plan initiated.     A: Vital signs stable upon admission. Patient rates pain at 5/10. Two RN skin assessment completed Yes. Second RN was Boby Chanel Significant Skin Findings include red spot on the right heel, but its blanchable, both of heels are dry and skin peeling due to chemo.[facility] Nurse Consult Ordered No. Bed Algorithm can be found in PCS flow sheets (Support Surface Algorithm) and on IP [facility] NURSE RESOURCE TAB, was this used during this assessment?Yes. Was a pulsate mattress ordered No.     P: Continue to monitor patient s pain and intervene as needed. Continue with plan of care. Notify MD with any concerns or changes in patient status.  AVSS alert and oriented x 4, denies nausea/sob. C/o pain around abdominal area, given PRN Norco x 1. Up independent voiding adequately, LBM 7/16 per pt.NPO after midnight for PNT placement at IR.  Continue to monitor care.

## 2023-07-18 NOTE — LETTER
Transition Communication Hand-off for Care Transitions to Next Level of Care Provider    Name: Abiel Ruiz  : 1955  MRN #: [id number]  Primary Care Provider: Marin Cintron MD     Primary Clinic: [place] [id number] [facility] [address]     Reason for Hospitalization:  Urothelial carcinoma (H) [C68.9]  Obstruction (acquired) of bladder neck or vesicourethral orifice [N32.0]  Admit Date/Time: 2023  8:53 AM  Discharge Date: 23         Reason for Communication Hand-off Referral: Other continuity of care following hospitalization.      Discharge Plan:  Home with home nursing.  New percutaneous nephrostomy tube.       Concern for non-adherence with plan of care:  No.      Key Recommendations:  Please follow patient post-hospital discharge.  Thanks!    Karen Lyn, FRANCESCACC    AVS/Discharge Summary is the source of truth; this is a helpful guide for improved communication of patient story

## 2023-07-18 NOTE — LETTER
[facility] [address] [address]  [phone]    FACSIMILE TRANSMITTAL SHEET    [facility]   FAX NUMBER: [phone]  PHONE NUMBER: [phone]     FROM: JJ Jones  PHONE: [phone]  DATE: 23      NOTES/COMMENTS: Home care orders for V.T.T.  1955.  Discharging today.  Please call patient to arrange home care.                                        IF YOU DID NOT RECEIVE THE CORRECT NUMBER OF PAGES OR THE FAX DID NOT COME THROUGH CLEARLY, PLEASE CALL THE SENDER     CONFIDENTIALITY STATEMENT: Confidential information that may accompany this transmission contains protected health information under state and federal law and is legally privileged. This information is intended only for the use of the individual or entity named above and may be used only for carrying out treatment, payment or other healthcare operations. The recipient or person responsible for delivering this information is prohibited by law from disclosing this information without proper authorization to any other party, unless required to do so by law or regulation. If you are not the intended recipient, you are hereby notified that any review, dissemination, distribution, or copying of this message is strictly prohibited. If you have received this communication in error, please destroy the materials and contact us immediately by calling the number listed above. No response indicates that the information was received by the appropriate authorized party

## 2023-07-18 NOTE — H&P
Resident/Fellow Attestation   I, Marzena Diaz MD, was present with the medical/STACY student who participated in the service and in the documentation of the note.  I have verified the history and personally performed the physical exam and medical decision making.  I agree with the assessment and plan of care as documented in the note.      Marzena Diaz MD  PGY2  Date of Service (when I saw the patient): 07/18/23    [facility]    History and Physical - Medicine Service, [facility] TEAM 3       Date of Admission:  7/18/2023    Assessment & Plan     Abiel Ruiz is a 68 year old female admitted on 7/18/2023. She has a history of urothelial carcinoma with muscular invasion complicated by urinary obstruction and hydronephrosis. She is admitted for percutaneous nephrostomy tube placement due to limited outpatient support.     Muscle invasive urothelial carcinoma c/b urinary obstruction  S/p TURBT on 5/10  Patient follows with Dr. Kirkland. Recent diagnosis of invasive urothelial carcinoma with urinary obstruction and with moderate L hydroureteronephrosis requiring PNT placement. Outpatient PNT with sedation not possible given that Ms. Farooq Ruiz does not have help at home and depends on [employer] [place].  - IR consulted, PNT scheduled for 7/19    - NPO at midnight  - Lorazepam 0.5 mg PO x1 prior to MRI     Pain  Lower back/flank and lower abdominal pain that radiates to groin. Rates in a 10/10 at its worst, 7/10 at its best. Limits mobility.   - PTA Norco 5mg Q6hr- Held due to stomach cramping  - Start Oxycodone 5 mg PO Q5hr PRN  - PTA gabapentin 300 mg PO TID  - Hydroxyzine 25 mg PO TID PRN     Gout  - PTA Allopurinol 100 mg PO daily  - PTA colchicine 0.6 mg PO PRN    Other PTA meds  - PTA Clonazepam 1 mg PO daily  - PTA docusate 100 mg PO BID  - PTA simvastatin 20 mg PO daily      Diet: Regular Diet Adult  NPO per Anesthesia Guidelines for  "Procedure/Surgery Except for: MedsRegular adult   DVT Prophylaxis: Pneumatic Compression Devices  Singh Catheter: Not present  Fluids: None  Lines: None     Cardiac Monitoring: None  Code Status: Full Code Full    Clinically Significant Risk Factors Present on Admission                  # Hypertension: Noted on problem list      # Overweight: Estimated body mass index is 27.67 kg/m  as calculated from the following:    Height as of this encounter: 1.67 m (5' 5.75\").    Weight as of this encounter: 77.2 kg (170 lb 1.6 oz).            Disposition Plan      Expected Discharge Date: 07/20/2023                The patient's care was discussed with the Attending Physician, Dr. Arzola.    Sary Joshimussen  Medical Student  Medicine Service, [address]  Securely message with Vocera (more info)  Text page via [facility] Paging/Directory   See signed in provider for up to date coverage information  ______________________________________________________________________    Chief Complaint   Scheduled admission for PNT placement    History is obtained from the patient and the medical record.     History of Present Illness   Abiel Ruiz is a 68 year old female admitted on 7/18/2023. She has a history of urothelial carcinoma with muscular invasion complicated by urinary obstruction and hydronephrosis. She also has a history of gout. She is admitted for percutaneous nephrostomy tube placement due to limited outpatient support.     In 2/2023, Ms. Farooq Ruiz initially had hematuria with clotting. In 4/2023 she underwent CT urogram notable for L sided hydronephrosis and fat density at [facility] concerning for metastatic disease. In 5/2023 she underwent cytoscopy with TURBT-L notable for dense, non-papillary mass involving trigone and bladder neck. Tumor was partially resected and pathology c/w invasive, high grade urothelial carcinoma with muscle invasion. Med onc " recommended PNT placement. Outpatient PNT with sedation not possible given that Ms. Farooq Ruiz does not have help at home and depends on [employer] [place].     Today she reports lower back/flank and lower abdominal pain that radiates to groin. Rates in a 10/10 at its worst, 7/10 at its best. The pain limits her mobility.     Accompanied today by her sister who lives in the [facility] and her brother who travelled in from [place].     Past Medical History    Past Medical History:   Diagnosis Date     Gout      Spider veins        Past Surgical History   Past Surgical History:   Procedure Laterality Date     BLADDER SURGERY N/A 05/10/2023    removal mass of urinary bladder       Prior to Admission Medications   Prior to Admission Medications   Prescriptions Last Dose Informant Patient Reported? Taking?   HYDROcodone-acetaminophen (NORCO) 5-325 MG tablet 7/18/2023 at 0500  No Yes   Sig: Take 1 tablet by mouth every 4 hours as needed for severe pain   allopurinol (ZYLOPRIM) 100 MG tablet 7/17/2023  No Yes   Sig: Take 1 tablet (100 mg) by mouth daily   clonazePAM (KLONOPIN) 1 MG tablet More than a month  Yes Yes   Sig: Take 1 tablet by mouth daily at 2 pm   colchicine (COLCRYS) 0.6 MG tablet More than a month  Yes Yes   Sig: colchicine 0.6 mg tablet   TAKE 2 TABS BY MOUTH WITH GOUT FLARE. MAY TAKE 2ND TABLET 1HR LATER, THEN 1 TAB EVERY DAY AS NEEDED   docusate sodium (COLACE) 100 MG capsule 7/17/2023  No Yes   Sig: Take 1 capsule (100 mg) by mouth 2 times daily   gabapentin (NEURONTIN) 600 MG tablet 7/17/2023  Yes Yes   Sig: Take 300 mg by mouth 3 times daily   hydrOXYzine (VISTARIL) 25 MG capsule 7/17/2023  Yes Yes   Sig: hydroxyzine pamoate 25 mg capsule   TAKE 1 CAP BY MOUTH THREE TIMES DAILY AS NEEDED. CAN TAKE 2 CAPS NIGHTLY AS NEEDED FOR SLEEP   naloxone (NARCAN) 4 MG/0.1ML nasal spray Unknown  No Yes   Sig: Spray 1 spray (4 mg) into one nostril alternating nostrils as needed for opioid reversal  every 2-3 minutes until assistance arrives   simvastatin (ZOCOR) 20 MG tablet 7/17/2023  Yes Yes   Sig: simvastatin 20 mg tablet   TAKE 1 TABLET BY MOUTH ONCE A DAY FOR CHOLESTEROL      Facility-Administered Medications: None        Review of Systems    The 10 point Review of Systems is negative other than noted in the HPI.             Allergies   Allergies   Allergen Reactions     Nitrofurantoin         Physical Exam   Vital Signs: Temp: 97.6  F (36.4  C) Temp src: Oral BP: (!) 143/54 Pulse: 57   Resp: 18 SpO2: 98 % O2 Device: None (Room air)    Weight: 170 lbs 1.6 oz    General Appearance: Appears uncomfortable but in no acute distress  Respiratory: Lungs clear to auscultation bilaterally. Breathing comfortably on room air.  Cardiovascular: RRR, no murmurs or gallops appreciated. Well perfused.   Skin: Warm, dry   Neuro: Oriented x3, responding appropriately to questions    Medical Decision Making       Please see A&P for additional details of medical decision making.      Data     I have personally reviewed the following data over the past 24 hrs:    7.7  \   11.9   / 306     140 105 13.3 /  87   3.9 22 1.40 (H) \       INR:  1.13 PTT:  N/A   D-dimer:  N/A Fibrinogen:  N/A       Imaging results reviewed over the past 24 hrs:   No results found for this or any previous visit (from the past 24 hour(s)).

## 2023-07-18 NOTE — PHARMACY-ADMISSION MEDICATION HISTORY
Pharmacist Admission Medication History    Admission medication history is complete. The information provided in this note is only as accurate as the sources available at the time of the update.    Medication reconciliation/reorder completed by provider prior to medication history? Yes    Information Source(s): Patient and CareEverywhere/SureScripts via phone    Pertinent Information: Patient requested medication to help with her sleep. She has failed mirtazapine in the past.     Changes made to PTA medication list:    Added: None    Deleted: None    Changed: -Gabapentin 300 mg three times daily     Allergies reviewed with patient and updates made in EHR: yes    Medication History Completed By: Florentino Mcmullen, Cristina 7/18/2023 1:03 PM    Prior to Admission medications    Medication Sig Last Dose Taking? Auth Provider Long Term End Date   allopurinol (ZYLOPRIM) 100 MG tablet Take 1 tablet (100 mg) by mouth daily 7/17/2023 Yes Tk Kirkland MD     clonazePAM (KLONOPIN) 1 MG tablet Take 1 tablet by mouth daily at 2 pm More than a month Yes Reported, Patient Yes    colchicine (COLCRYS) 0.6 MG tablet colchicine 0.6 mg tablet   TAKE 2 TABS BY MOUTH WITH GOUT FLARE. MAY TAKE 2ND TABLET 1HR LATER, THEN 1 TAB EVERY DAY AS NEEDED More than a month Yes Reported, Patient     docusate sodium (COLACE) 100 MG capsule Take 1 capsule (100 mg) by mouth 2 times daily 7/17/2023 Yes Tk Kirkland MD     gabapentin (NEURONTIN) 600 MG tablet Take 300 mg by mouth 3 times daily 7/17/2023 Yes Reported, Patient Yes    HYDROcodone-acetaminophen (NORCO) 5-325 MG tablet Take 1 tablet by mouth every 4 hours as needed for severe pain 7/18/2023 at 0500 Yes Tk Kirkland MD  7/24/23   hydrOXYzine (VISTARIL) 25 MG capsule hydroxyzine pamoate 25 mg capsule   TAKE 1 CAP BY MOUTH THREE TIMES DAILY AS NEEDED. CAN TAKE 2 CAPS NIGHTLY AS NEEDED FOR SLEEP 7/17/2023 Yes Reported, Patient     naloxone (NARCAN) 4  MG/0.1ML nasal spray Spray 1 spray (4 mg) into one nostril alternating nostrils as needed for opioid reversal every 2-3 minutes until assistance arrives Unknown Yes Tk Kirkland MD Yes    simvastatin (ZOCOR) 20 MG tablet simvastatin 20 mg tablet   TAKE 1 TABLET BY MOUTH ONCE A DAY FOR CHOLESTEROL 7/17/2023 Yes Reported, Patient Yes

## 2023-07-19 ENCOUNTER — DOCUMENTATION ONLY (OUTPATIENT)
Dept: ONCOLOGY | Facility: CLINIC | Age: 68
End: 2023-07-19
Payer: COMMERCIAL

## 2023-07-19 ENCOUNTER — APPOINTMENT (OUTPATIENT)
Dept: INTERVENTIONAL RADIOLOGY/VASCULAR | Facility: CLINIC | Age: 68
End: 2023-07-19
Attending: STUDENT IN AN ORGANIZED HEALTH CARE EDUCATION/TRAINING PROGRAM
Payer: COMMERCIAL

## 2023-07-19 DIAGNOSIS — C79.10 METASTATIC UROTHELIAL CARCINOMA (H): Primary | ICD-10-CM

## 2023-07-19 PROBLEM — N32.0: Status: ACTIVE | Noted: 2023-07-19

## 2023-07-19 LAB
ANION GAP SERPL CALCULATED.3IONS-SCNC: 12 MMOL/L (ref 7–15)
ANION GAP SERPL CALCULATED.3IONS-SCNC: 13 MMOL/L (ref 7–15)
BUN SERPL-MCNC: 14.4 MG/DL (ref 8–23)
BUN SERPL-MCNC: 16.6 MG/DL (ref 8–23)
CALCIUM SERPL-MCNC: 9.4 MG/DL (ref 8.8–10.2)
CALCIUM SERPL-MCNC: 9.8 MG/DL (ref 8.8–10.2)
CHLORIDE SERPL-SCNC: 102 MMOL/L (ref 98–107)
CHLORIDE SERPL-SCNC: 105 MMOL/L (ref 98–107)
CREAT SERPL-MCNC: 1.44 MG/DL (ref 0.51–0.95)
CREAT SERPL-MCNC: 1.48 MG/DL (ref 0.51–0.95)
DEPRECATED HCO3 PLAS-SCNC: 22 MMOL/L (ref 22–29)
DEPRECATED HCO3 PLAS-SCNC: 23 MMOL/L (ref 22–29)
ERYTHROCYTE [DISTWIDTH] IN BLOOD BY AUTOMATED COUNT: 13.1 % (ref 10–15)
ERYTHROCYTE [DISTWIDTH] IN BLOOD BY AUTOMATED COUNT: 13.1 % (ref 10–15)
GFR SERPL CREATININE-BSD FRML MDRD: 38 ML/MIN/1.73M2
GFR SERPL CREATININE-BSD FRML MDRD: 39 ML/MIN/1.73M2
GLUCOSE SERPL-MCNC: 88 MG/DL (ref 70–99)
GLUCOSE SERPL-MCNC: 96 MG/DL (ref 70–99)
HCT VFR BLD AUTO: 38.2 % (ref 35–47)
HCT VFR BLD AUTO: 41.1 % (ref 35–47)
HGB BLD-MCNC: 11.8 G/DL (ref 11.7–15.7)
HGB BLD-MCNC: 12.6 G/DL (ref 11.7–15.7)
MCH RBC QN AUTO: 29.7 PG (ref 26.5–33)
MCH RBC QN AUTO: 29.9 PG (ref 26.5–33)
MCHC RBC AUTO-ENTMCNC: 30.7 G/DL (ref 31.5–36.5)
MCHC RBC AUTO-ENTMCNC: 30.9 G/DL (ref 31.5–36.5)
MCV RBC AUTO: 97 FL (ref 78–100)
MCV RBC AUTO: 97 FL (ref 78–100)
PLATELET # BLD AUTO: 293 10E3/UL (ref 150–450)
PLATELET # BLD AUTO: 329 10E3/UL (ref 150–450)
POTASSIUM SERPL-SCNC: 3.7 MMOL/L (ref 3.4–5.3)
POTASSIUM SERPL-SCNC: 4.4 MMOL/L (ref 3.4–5.3)
RBC # BLD AUTO: 3.94 10E6/UL (ref 3.8–5.2)
RBC # BLD AUTO: 4.24 10E6/UL (ref 3.8–5.2)
SODIUM SERPL-SCNC: 138 MMOL/L (ref 136–145)
SODIUM SERPL-SCNC: 139 MMOL/L (ref 136–145)
WBC # BLD AUTO: 10.7 10E3/UL (ref 4–11)
WBC # BLD AUTO: 8.1 10E3/UL (ref 4–11)

## 2023-07-19 PROCEDURE — G0378 HOSPITAL OBSERVATION PER HR: HCPCS

## 2023-07-19 PROCEDURE — 80048 BASIC METABOLIC PNL TOTAL CA: CPT

## 2023-07-19 PROCEDURE — 250N000013 HC RX MED GY IP 250 OP 250 PS 637: Performed by: HOSPITALIST

## 2023-07-19 PROCEDURE — 99152 MOD SED SAME PHYS/QHP 5/>YRS: CPT | Mod: GC | Performed by: STUDENT IN AN ORGANIZED HEALTH CARE EDUCATION/TRAINING PROGRAM

## 2023-07-19 PROCEDURE — 250N000013 HC RX MED GY IP 250 OP 250 PS 637

## 2023-07-19 PROCEDURE — 99152 MOD SED SAME PHYS/QHP 5/>YRS: CPT

## 2023-07-19 PROCEDURE — 250N000011 HC RX IP 250 OP 636: Performed by: STUDENT IN AN ORGANIZED HEALTH CARE EDUCATION/TRAINING PROGRAM

## 2023-07-19 PROCEDURE — 250N000011 HC RX IP 250 OP 636: Performed by: RADIOLOGY

## 2023-07-19 PROCEDURE — 258N000003 HC RX IP 258 OP 636: Performed by: RADIOLOGY

## 2023-07-19 PROCEDURE — 36415 COLL VENOUS BLD VENIPUNCTURE: CPT

## 2023-07-19 PROCEDURE — 82310 ASSAY OF CALCIUM: CPT

## 2023-07-19 PROCEDURE — 50432 PLMT NEPHROSTOMY CATHETER: CPT | Mod: LT | Performed by: STUDENT IN AN ORGANIZED HEALTH CARE EDUCATION/TRAINING PROGRAM

## 2023-07-19 PROCEDURE — 272N000508 HC NEEDLE CR8

## 2023-07-19 PROCEDURE — 99232 SBSQ HOSP IP/OBS MODERATE 35: CPT | Mod: GC | Performed by: HOSPITALIST

## 2023-07-19 PROCEDURE — 255N000002 HC RX 255 OP 636: Performed by: HOSPITALIST

## 2023-07-19 PROCEDURE — 250N000009 HC RX 250: Performed by: STUDENT IN AN ORGANIZED HEALTH CARE EDUCATION/TRAINING PROGRAM

## 2023-07-19 PROCEDURE — 85014 HEMATOCRIT: CPT

## 2023-07-19 PROCEDURE — C1729 CATH, DRAINAGE: HCPCS

## 2023-07-19 PROCEDURE — 85027 COMPLETE CBC AUTOMATED: CPT

## 2023-07-19 RX ORDER — NALOXONE HYDROCHLORIDE 0.4 MG/ML
0.4 INJECTION, SOLUTION INTRAMUSCULAR; INTRAVENOUS; SUBCUTANEOUS
Status: DISCONTINUED | OUTPATIENT
Start: 2023-07-19 | End: 2023-07-19 | Stop reason: HOSPADM

## 2023-07-19 RX ORDER — OXYCODONE HYDROCHLORIDE 5 MG/1
5-10 TABLET ORAL EVERY 4 HOURS PRN
Status: DISCONTINUED | OUTPATIENT
Start: 2023-07-19 | End: 2023-07-20 | Stop reason: HOSPADM

## 2023-07-19 RX ORDER — FLUMAZENIL 0.1 MG/ML
0.2 INJECTION, SOLUTION INTRAVENOUS
Status: DISCONTINUED | OUTPATIENT
Start: 2023-07-19 | End: 2023-07-19 | Stop reason: HOSPADM

## 2023-07-19 RX ORDER — IODIXANOL 320 MG/ML
100 INJECTION, SOLUTION INTRAVASCULAR ONCE
Status: COMPLETED | OUTPATIENT
Start: 2023-07-19 | End: 2023-07-19

## 2023-07-19 RX ORDER — HYDROMORPHONE HCL IN WATER/PF 6 MG/30 ML
0.2 PATIENT CONTROLLED ANALGESIA SYRINGE INTRAVENOUS EVERY 4 HOURS PRN
Status: DISCONTINUED | OUTPATIENT
Start: 2023-07-19 | End: 2023-07-20 | Stop reason: HOSPADM

## 2023-07-19 RX ORDER — LORAZEPAM 0.5 MG/1
0.5 TABLET ORAL
Status: COMPLETED | OUTPATIENT
Start: 2023-07-19 | End: 2023-07-19

## 2023-07-19 RX ORDER — AMPICILLIN 1 G/1
1 INJECTION, POWDER, FOR SOLUTION INTRAMUSCULAR; INTRAVENOUS
Status: COMPLETED | OUTPATIENT
Start: 2023-07-19 | End: 2023-07-19

## 2023-07-19 RX ORDER — LIDOCAINE 40 MG/G
CREAM TOPICAL
Status: DISCONTINUED | OUTPATIENT
Start: 2023-07-19 | End: 2023-07-19 | Stop reason: HOSPADM

## 2023-07-19 RX ORDER — NALOXONE HYDROCHLORIDE 0.4 MG/ML
0.2 INJECTION, SOLUTION INTRAMUSCULAR; INTRAVENOUS; SUBCUTANEOUS
Status: DISCONTINUED | OUTPATIENT
Start: 2023-07-19 | End: 2023-07-19 | Stop reason: HOSPADM

## 2023-07-19 RX ORDER — OXYCODONE HYDROCHLORIDE 10 MG/1
10 TABLET ORAL EVERY 4 HOURS PRN
Status: DISCONTINUED | OUTPATIENT
Start: 2023-07-19 | End: 2023-07-19

## 2023-07-19 RX ORDER — LORAZEPAM 0.5 MG/1
0.5 TABLET ORAL ONCE
Status: DISCONTINUED | OUTPATIENT
Start: 2023-07-19 | End: 2023-07-19

## 2023-07-19 RX ORDER — FENTANYL CITRATE 50 UG/ML
25-50 INJECTION, SOLUTION INTRAMUSCULAR; INTRAVENOUS EVERY 5 MIN PRN
Status: DISCONTINUED | OUTPATIENT
Start: 2023-07-19 | End: 2023-07-19 | Stop reason: HOSPADM

## 2023-07-19 RX ORDER — SODIUM CHLORIDE 9 MG/ML
INJECTION, SOLUTION INTRAVENOUS CONTINUOUS
Status: DISCONTINUED | OUTPATIENT
Start: 2023-07-19 | End: 2023-07-19 | Stop reason: HOSPADM

## 2023-07-19 RX ADMIN — CLONAZEPAM 1 MG: 0.5 TABLET ORAL at 13:00

## 2023-07-19 RX ADMIN — DOCUSATE SODIUM 100 MG: 100 CAPSULE, LIQUID FILLED ORAL at 08:28

## 2023-07-19 RX ADMIN — LORAZEPAM 0.5 MG: 0.5 TABLET ORAL at 14:05

## 2023-07-19 RX ADMIN — FENTANYL CITRATE 25 MCG: 50 INJECTION, SOLUTION INTRAMUSCULAR; INTRAVENOUS at 14:51

## 2023-07-19 RX ADMIN — GABAPENTIN 300 MG: 300 CAPSULE ORAL at 08:28

## 2023-07-19 RX ADMIN — MIDAZOLAM HYDROCHLORIDE 0.5 MG: 1 INJECTION, SOLUTION INTRAMUSCULAR; INTRAVENOUS at 14:42

## 2023-07-19 RX ADMIN — GABAPENTIN 300 MG: 300 CAPSULE ORAL at 13:00

## 2023-07-19 RX ADMIN — SIMVASTATIN 20 MG: 10 TABLET, FILM COATED ORAL at 21:09

## 2023-07-19 RX ADMIN — OXYCODONE HYDROCHLORIDE 10 MG: 5 TABLET ORAL at 19:52

## 2023-07-19 RX ADMIN — GABAPENTIN 300 MG: 300 CAPSULE ORAL at 19:49

## 2023-07-19 RX ADMIN — MIDAZOLAM HYDROCHLORIDE 0.5 MG: 1 INJECTION, SOLUTION INTRAMUSCULAR; INTRAVENOUS at 14:51

## 2023-07-19 RX ADMIN — DOCUSATE SODIUM 100 MG: 100 CAPSULE, LIQUID FILLED ORAL at 19:49

## 2023-07-19 RX ADMIN — LIDOCAINE HYDROCHLORIDE 10 ML: 10 INJECTION, SOLUTION EPIDURAL; INFILTRATION; INTRACAUDAL; PERINEURAL at 14:52

## 2023-07-19 RX ADMIN — FENTANYL CITRATE 25 MCG: 50 INJECTION, SOLUTION INTRAMUSCULAR; INTRAVENOUS at 14:42

## 2023-07-19 RX ADMIN — GENTAMICIN SULFATE 110 MG: 40 INJECTION, SOLUTION INTRAMUSCULAR; INTRAVENOUS at 14:44

## 2023-07-19 RX ADMIN — AMPICILLIN SODIUM 1 G: 1 INJECTION, POWDER, FOR SOLUTION INTRAMUSCULAR; INTRAVENOUS at 14:23

## 2023-07-19 RX ADMIN — OXYCODONE HYDROCHLORIDE 5 MG: 5 TABLET ORAL at 08:28

## 2023-07-19 RX ADMIN — ALLOPURINOL 100 MG: 100 TABLET ORAL at 08:28

## 2023-07-19 RX ADMIN — OXYCODONE HYDROCHLORIDE 5 MG: 5 TABLET ORAL at 04:23

## 2023-07-19 RX ADMIN — IODIXANOL 10 ML: 320 INJECTION, SOLUTION INTRAVASCULAR at 15:07

## 2023-07-19 RX ADMIN — OXYCODONE HYDROCHLORIDE 5 MG: 5 TABLET ORAL at 00:18

## 2023-07-19 RX ADMIN — OXYCODONE HYDROCHLORIDE 10 MG: 5 TABLET ORAL at 11:08

## 2023-07-19 RX ADMIN — LORAZEPAM 0.5 MG: 0.5 TABLET ORAL at 09:40

## 2023-07-19 ASSESSMENT — ACTIVITIES OF DAILY LIVING (ADL)
ADLS_ACUITY_SCORE: 20

## 2023-07-19 NOTE — PLAN OF CARE
Provider notified (name): Maxim Silverio MD  Reason for notification: no antianxiety medications ordered prior to procedure  Recommendation/request given to provider: please adivse  Response from provider: will order now

## 2023-07-19 NOTE — PROVIDER NOTIFICATION
"Provider Notification    Xi Hernandez MD via [employer] at 3555:    \"Hi, gave patient 5 mg of oxycodone at 2015 for pain 10/10. Patient states it helped but is still in a lot of pain at 8/10. Can I get something else ordered for pain or a range dose of oxycodone? Thanks!\"    Response: One-time 2 mg Dilaudid tablet ordered.  "

## 2023-07-19 NOTE — IR NOTE
Patient Name: Abiel Ruiz  Medical Record Number: [id number]  Today's Date: 7/19/2023    Procedure: left nephrostomy tube placement   Proceduralist: GUS Pham MD, TAYLOR Chisholm MD  Pathology present: N/A  Brief completed: N/A    Procedure Start: 1448  Procedure end: 1511  Sedation medications administered: 1 mg of versed, 50 mcg of fentanyl  Sedation time: 29 minutes ([id number])    Report given to: Smiley PECK RN  : N/A    Other Notes: Pt arrived to IR room 04 from 7D. Consent reviewed. Pt denies any questions or concerns regarding procedure. Pt positioned prone and monitored per protocol. Pt tolerated procedure without any noted complications. Left flank site cleansed and dressed per protocol. Pt transferred back to 7D.

## 2023-07-19 NOTE — PLAN OF CARE
Patient cares assumed from [id number]  Reason for admission: percutaneous nephrostomy tube placement    VS: VSS  Pain/Nausea: c/o abdominal pain PRN oxycodone given, denies nausea  Neuro: alert and oriented x4  Cardiac: WNL denies chest apin  Resp: RA LSC  GI/: voids last BM 7/18  Skin: intact  Diet: NPO    Activity: independent  LDA: PIV  Labs: reviewed    Plan: continue with plan of care

## 2023-07-19 NOTE — PROGRESS NOTES
"[facility]    Progress Note - Medicine Service, [facility] TEAM 3       Date of Admission:  7/18/2023    Assessment & Plan   Abiel Ruiz is a 68 year old female admitted on 7/18/2023. She has a history of urothelial carcinoma with muscular invasion complicated by urinary obstruction and hydronephrosis. She is admitted for percutaneous nephrostomy tube placement due to limited outpatient support.      Muscle invasive urothelial carcinoma c/b urinary obstruction  S/p TURBT on 5/10  Patient follows with Dr. Kirkland. Recent diagnosis of invasive urothelial carcinoma with urinary obstruction and with moderate L hydroureteronephrosis requiring PNT placement. Outpatient PNT with sedation not possible given that Ms. Farooq Ruiz does not have help at home and depends on [employer] [place].  - S/P L PNT placement on 7/19/23  - Plan for outpatient oncology follow-up with [facility] given proximity to home     Pain  Lower back/flank and lower abdominal pain that radiates to groin. Rates in a 10/10 at its worst, 7/10 at its best. Limits mobility.   - PTA Norco 5mg Q6hr- Held due to stomach cramping  - Start Oxycodone 5-10 mg PO Q5hr PRN  - PTA gabapentin 300 mg PO TID  - Hydroxyzine 25 mg PO TID PRN      Gout  - PTA Allopurinol 100 mg PO daily  - PTA colchicine 0.6 mg PO PRN     Other PTA meds  - PTA Clonazepam 1 mg PO daily  - PTA docusate 100 mg PO BID  - PTA simvastatin 20 mg PO daily      Diet:  Regular  DVT Prophylaxis: Pneumatic Compression Devices  Singh Catheter: Not present  Fluids: None  Lines: None     Cardiac Monitoring: None  Code Status: Full Code      Clinically Significant Risk Factors Present on Admission                  # Hypertension: Noted on problem list      # Overweight: Estimated body mass index is 27.57 kg/m  as calculated from the following:    Height as of this encounter: 1.67 m (5' 5.75\").    Weight as of this encounter: " 76.9 kg (169 lb 8 oz).            Disposition Plan      Expected Discharge Date: 07/20/2023                The patient's care was discussed with the Attending Physician, Dr. Arzola.    Marzena Diaz MD  Medicine Service, [facility] TEAM [address] [place] [address]  Securely message with Vocera (more info)  Text page via [facility] Paging/Directory   See signed in provider for up to date coverage information  ______________________________________________________________________    Interval History   Care team notes reviewed. Patient anxious about PNT placement under moderate sedation. Requested general anesthesia however ultimately decided she would be able to undergo procedure with moderate sedation. Her pain is stable compared to yesterday. She denies nausea, vomiting. She has no additional concerns to address with the team today.    Physical Exam   Vital Signs: Temp: 98.5  F (36.9  C) Temp src: Oral BP: 135/66 Pulse: 62   Resp: 17 SpO2: 100 % O2 Device: Nasal cannula Oxygen Delivery: 1 LPM  Weight: 169 lbs 8 oz    Physical Exam  Constitutional:       General: She is not in acute distress.  HENT:      Head: Normocephalic and atraumatic.   Eyes:      Extraocular Movements: Extraocular movements intact.      Conjunctiva/sclera: Conjunctivae normal.   Pulmonary:      Effort: Pulmonary effort is normal.      Comments: Breathing comfortably on room air  Abdominal:      General: There is no distension.      Palpations: Abdomen is soft.   Musculoskeletal:      Right lower leg: No edema.      Left lower leg: No edema.   Neurological:      General: No focal deficit present.      Mental Status: She is alert and oriented to person, place, and time.       Medical Decision Making       Please see A&P for additional details of medical decision making.      Data     I have personally reviewed the following data over the past 24 hrs:    10.7  \   12.6   / 329     138 102 16.6 /  96   3.7 23 1.44 (H) \        Imaging results reviewed over the past 24 hrs:   Recent Results (from the past 24 hour(s))   IR Nephrostomy Tube Placement Left    Narrative    PROCEDURE: Genitourinary catheter placement, left percutaneous  nephrostomy tube    Procedural Personnel  Attending physician(s): Rafa Pham MD  Fellow physician(s): None  Resident physician(s): Tk Chisholm MD  Advanced practice provider(s): None    Pre-procedure diagnosis: Left ureteral obstruction; left  hydroureteronephrosis  Post-procedure diagnosis: Same  Indication: Urinary obstruction  No  Additional clinical history: Pt in consultation with urology for  possible future total cystectomy.    Complications: No immediate complications.      Impression    IMPRESSION:    Left nephrostomy tube placement.    Plan:   1. Return to inpatient unit for 1 hour strict bedrest followed by 1  hour bedrest with bathroom privileges with assist. ?Monitor  nephrostomy tube for gross hematuria.  2. Resume pre-procedure orders at the discretion of the primary team.  3. Flush nephrostomy tube every shift with 10 mL normal saline.  ?Perform until urine is no longer bloody, then ok to discontinue  flushes once urine is clear  4. Return to IR in 90 days for routine PNT check and exchange.  _______________________________________________________________    PROCEDURE SUMMARY  - Target organ: Unilateral native kidney (left)  - Image-guided placement of genitourinary catheter(s)  - Additional procedure(s): None    PROCEDURE DETAILS:    Pre-procedure  Consent: Informed consent for the procedure including risks, benefits  and alternatives was obtained and time-out was performed prior to the  procedure.  Preparation: The site was prepared and draped using maximal sterile  barrier technique including cutaneous antisepsis.    Anesthesia/sedation  Level of anesthesia/sedation: Moderate sedation (conscious sedation)  Anesthesia/sedation administered by: Independent trained observer  under  attending supervision with continuous monitoring of the  patient?s level of consciousness and physiologic status  Total intra-service sedation time (minutes): 29    Genitourinary catheter placement  Side:Left native  Local anesthesia was administered. A needle was advanced into an  interpolar calyx under ultrasound and fluoroscopy guidance. A wire was  advanced, the tract was serially dilated and a nephrostomy tube was  placed  . Contrast injection was performed.  Genitourinary catheter placed: 10 Fr. 35 cm locking pigtail SKATER  catheter   Findings: No concerning findings  External catheter securement: Non-absorbable suture    Contrast  Contrast agent: Visipaque 320  Contrast volume (mL): 5    Radiation Dose  Fluoroscopy time (minutes): 2.4    Reference air kerma (mGy): 7.6   Kerma area product (uGy-m2): 118.85     Additional Details  Additional description of procedure: None  Registry event: V/3/f  Device used: Not applicable  Equipment details: None  Specimens removed: None. A sample was not sent for analysis.  Estimated blood loss (mL): Less than 10  Standardized report: [email or web address]_v3.1    Attestation  Signer name: MCKENNA RICHARDSON MD  I attest that I was present for the entire procedure. I reviewed the  stored images and agree with the report as written.      I have personally reviewed the examination and initial interpretation  and I agree with the findings.    MCKENNA RICHARDSON MD         SYSTEM ID:  [id number]

## 2023-07-19 NOTE — PROCEDURES
[facility]    Procedure: Image-guided left percutaneous nephrostomy tube placement    Date/Time: 7/19/2023 3:26 PM    Performed by: Rafa Pham MD  Authorized by: Rafa Pham MD  IR Fellow Physician:  Radiology Resident Physician: Tk Chisholm MD        UNIVERSAL PROTOCOL   Site Marked: Yes  Prior Images Obtained and Reviewed:  Yes  Required items: Required blood products, implants, devices and special equipment available    Patient identity confirmed:  Verbally with patient, arm band, provided demographic data and hospital-assigned identification number  Patient was reevaluated immediately before administering moderate or deep sedation or anesthesia  Confirmation Checklist:  Patient's identity using two indicators, relevant allergies, procedure was appropriate and matched the consent or emergent situation and correct equipment/implants were available  Time out: Immediately prior to the procedure a time out was called    Universal Protocol: the Joint Commission Universal Protocol was followed    Preparation: Patient was prepped and draped in usual sterile fashion       ANESTHESIA    Anesthesia: Local infiltration  Local Anesthetic:  Lidocaine 1% without epinephrine  Anesthetic Total (mL):  10      SEDATION  Patient Sedated: Yes    Sedation:  Fentanyl and midazolam  Vital signs: Vital signs monitored during sedation    Fluoroscopy Time: 2 minute(s)  See dictated procedure note for full details.  Findings: Vitals stable throughout procedure    Specimens: none    Complications: None    Condition: Stable    Plan:   1. Return to inpatient unit for 1 hour strict bedrest followed by 1 hour bedrest with bathroom privileges with assist.  Monitor nephrostomy tube for gross hematuria.  2. Resume pre-procedure orders at the discretion of the primary team.  3. Flush nephrostomy tube every shift with 10 mL normal saline, then aspirate back 10 mL.  Perform until  urine is no longer bloody, then ok to discontinue.  4. Return to IR in 90 days for routine PNT check and exchange.      PROCEDURE  Describe Procedure: Ultrasound and fluoroscopically guided left percutaneous nephrostomy tube placement.  Patient Tolerance:  Patient tolerated the procedure well with no immediate complications  Length of time physician/provider present for 1:1 monitoring during sedation: 30

## 2023-07-19 NOTE — PLAN OF CARE
"Outcome Evaluation: VSS, afebrile. Pain in abdomen controlled with PRN oxycodone. No outstanding labs. Plan: NPO at 0000 for nephrostomy tube placement.    Neuro: A&Ox4.   Cardiac: VSS.   Respiratory: Sating >90% on RA.  GI/: Adequate urine output, unmeasured. No BM this shift.  Diet/appetite: Tolerating regular diet. Eating well.  Activity: Independent in room. Encouraged to ambulate in halls SBA and she deferred to a later time  Pain: At acceptable level on current regimen. Oxycodone x1  Skin: No new deficits noted.  LDA's: PIV SL  Psychosocial: WDL. Family available by phone.    Problem: Plan of Care - These are the overarching goals to be used throughout the patient stay.    Goal: Plan of Care Review  Description: The Plan of Care Review/Shift note should be completed every shift.  The Outcome Evaluation is a brief statement about your assessment that the patient is improving, declining, or no change.  This information will be displayed automatically on your shift note.  Outcome: Progressing  Flowsheets (Taken 7/18/2023 1902)  Outcome Evaluation: VSS, afebrile. Pain controlled with PRN oxycodone. No outstanding labs. Plan: NPO at 0000 for nephrostomy tube placement.  Plan of Care Reviewed With: patient  Overall Patient Progress: no change  Goal: Patient-Specific Goal (Individualized)  Description: You can add care plan individualizations to a care plan. Examples of Individualization might be:  \"Parent requests to be called daily at 9am for status\", \"I have a hard time hearing out of my right ear\", or \"Do not touch me to wake me up as it startles me\".  Outcome: Progressing  Flowsheets (Taken 7/18/2023 1902)  Individualized Care Needs: Up ad jabier in room. Able to make needs known.  Anxieties, Fears or Concerns: Anxious about not being able to be under general anesthesia (unclear if she will be or not)  Patient/Family-Specific Goals (Include Timeframe): Goal: have no pain  Goal: Absence of Hospital-Acquired " Illness or Injury  Outcome: Progressing  Intervention: Identify and Manage Fall Risk  Recent Flowsheet Documentation  Taken 7/18/2023 1614 by Maru Medina RN  Safety Promotion/Fall Prevention:   assistive device/personal items within reach   clutter free environment maintained   increased rounding and observation   increase visualization of patient   lighting adjusted   nonskid shoes/slippers when out of bed   treat underlying cause   room door open   room near nurse's station  Intervention: Prevent Skin Injury  Recent Flowsheet Documentation  Taken 7/18/2023 1614 by Maru Medina RN  Body Position: position changed independently  Intervention: Prevent Infection  Recent Flowsheet Documentation  Taken 7/18/2023 1614 by Maru Medina RN  Infection Prevention:   rest/sleep promoted   personal protective equipment utilized   hand hygiene promoted   equipment surfaces disinfected   environmental surveillance performed   cohorting utilized  Goal: Optimal Comfort and Wellbeing  Outcome: Progressing  Intervention: Monitor Pain and Promote Comfort  Recent Flowsheet Documentation  Taken 7/18/2023 1614 by Maru Medina RN  Pain Management Interventions: medication (see MAR)  Goal: Readiness for Transition of Care  Outcome: Progressing     Problem: Pain Acute  Goal: Optimal Pain Control and Function  Outcome: Progressing  Intervention: Develop Pain Management Plan  Recent Flowsheet Documentation  Taken 7/18/2023 1614 by Maru Medina RN  Pain Management Interventions: medication (see MAR)  Intervention: Prevent or Manage Pain  Recent Flowsheet Documentation  Taken 7/18/2023 1614 by Maru Medina RN  Medication Review/Management:   medications reviewed   high-risk medications identified     Problem: Coping Ineffective (Oncology Care)  Goal: Effective Coping  Outcome: Progressing     Problem: Fatigue (Oncology Care)  Goal: Improved Activity Tolerance  Outcome: Progressing  Intervention: Promote Improved  Energy  Recent Flowsheet Documentation  Taken 7/18/2023 1614 by Maru Medina RN  Activity Management: up ad jabier     Problem: Oral Intake Altered (Oncology Care)  Goal: Optimal Oral Intake  Outcome: Progressing  Intervention: Minimize and Manage Barriers to Oral Intake  Recent Flowsheet Documentation  Taken 7/18/2023 1614 by Maru Medina RN  Oral Care: oral rinse provided     Problem: Pain Acute (Oncology Care)  Goal: Optimal Pain Control  Outcome: Progressing  Intervention: Develop Pain Management Plan  Recent Flowsheet Documentation  Taken 7/18/2023 1614 by Maru Medina RN  Pain Management Interventions: medication (see MAR)  Intervention: Prevent or Manage Pain  Recent Flowsheet Documentation  Taken 7/18/2023 1614 by Maru Medina RN  Medication Review/Management:   medications reviewed   high-risk medications identified    Problem: Oral Mucositis (Oncology Care)  Goal: Improved Oral Mucous Membrane Integrity  Outcome: Met  Intervention: Promote Oral Comfort and Health  Recent Flowsheet Documentation  Taken 7/18/2023 1614 by Maru Medina RN  Oral Care: oral rinse provided          Goal Outcome Evaluation:      Plan of Care Reviewed With: patient    Overall Patient Progress: no changeOverall Patient Progress: no change

## 2023-07-19 NOTE — PLAN OF CARE
"Goal Outcome Evaluation:       Time: 7662-8071    /63 (BP Location: Left arm)   Pulse 71   Temp 97.9  F (36.6  C) (Oral)   Resp 20   Ht 1.67 m (5' 5.75\")   Wt 77.2 kg (170 lb 1.6 oz)   SpO2 96%   BMI 27.67 kg/m      Reason for admission: Urothelial carcinoma  Activity: UAL  Pain: Left flank pain, PRN oxy given x3, one-time oral Dilaudid given  Neuro: A&Ox4  Cardiac: WDL  Respiratory: WDL  GI/: LBM 7/18, voiding spontaneously   Diet: Regular-NPO since 0000  Lines: Right PIV-SL  Labs/imaging: Reviewed. No replacements needed.     Plan: Left PNT placement today at 1500.      Continue to monitor and follow POC                  "

## 2023-07-19 NOTE — CONSULTS
[facility] - Medical Oncology New Inpatient Consult Note  2023    Patient Identifiers     Name: Abiel Ruiz  Preferred Address: Ms. Ruiz  Preferred Language: English  : 1955  Gender: female    Assessment and Plan     Ms. Abiel Ruiz is a 68 year old female with prior history of stage III muscle invasive bladder cancer c/b urinary obstruction currently admitted for elective PNT placement. Medical oncology consulted for management counseling.    Following discussion with the patient and family at bedside, no significant questions remain for patient regarding planned inpatient treatment.  MRI requested by primary oncologist was transferred, and and we discussed his findings with him.  He will reach out to patient to provide further treatment counseling.  PNT placement to be managed by IR, with subsequent discharge per IR and internal medicine expertise.Patient has no other acute inpatient Med Onc needs.  Oncology consult to sign off.    Thank you for this interesting consult. Oncology Consult Service to sign off. Feel free to reach out with further questions.     90 minutes spent on the date of the encounter doing chart review, review of outside records, review of test results, interpretation of tests, patient visit, documentation, discussion with other provider(s) and discussion with family     Jac Gauthier MD, PhD   of Medicine  Division of Hematology, Oncology and Transplantation  [facility]    -----------------------------------    Oncology Summary and HPI     Cancer Staging   Malignant neoplasm of overlapping sites of bladder (H)  Staging form: Urinary Bladder, AJCC 8th Edition  - Clinical: Stage IIIA (cT4a, cN0, cM0) - Signed by Jac Gauthier MD on 2023      Oncology History   Malignant neoplasm of overlapping sites of bladder (H)   2023 Initial Diagnosis    Malignant neoplasm of overlapping sites of bladder (H)    "  7/18/2023 -  Cancer Staged    Staging form: Urinary Bladder, AJCC 8th Edition  - Clinical: Stage IIIA (cT4a, cN0, cM0)         Subjective/Interval Events     - pt with food at bedside, requested clarification regarding procedure timing. Note from earlier today shows that pt's procedure is planned for tomorrow. Pt's brother has come from [place] and wishes to ensure that it will performed prior to his leaving on Friday.     - pt otherwise feeling well without complaints. Notified her that Dr. Kirkland wished to speak with her regarding his interpretation of MRI and potential treatment options    Physical Exam     Vital signs: /71 (BP Location: Left arm)   Pulse 71   Temp 97.8  F (36.6  C) (Oral)   Resp 18   Ht 1.67 m (5' 5.75\")   Wt 77.2 kg (170 lb 1.6 oz)   SpO2 97%   BMI 27.67 kg/m      ECOG performance status:  1  Vascular access:  PIV    Physical Exam:  Exam performed, notable for:  - breathing comfortably on RA  - no other notables    Objective Data     Lab data:  I have personally reviewed the lab data, notable for:    - Cr 1.40    Radiology data:  I have personally reviewed the radiology data, notable for:  07/17/2023 MR Pelvis  1. Infiltrative urethral mass lesion invades anterior vagina and bladder neck, with obstruction of L ureterovesicle junction  2. No gordy or distant mets noted    Pathology and other data:  I have personally reviewed and interpreted the pathology data, notable for:    06/13/2023 Path Consult  Final Diagnosis   CASE FROM [facility], [place], [place] ([id number], OBTAINED 05/10/23):  Bladder, trigone tumor, transurethral resection  - Invasive high grade urothelial carcinoma  - Carcinoma invades muscularis propria      Medical/Surgical History     Past medical history:  Active Ambulatory Problems     Diagnosis Date Noted     Chronic gout without tophus 02/16/2016     Essential hypertension 02/16/2016     Increased frequency of urination 07/05/2016     Status " post total replacement of right hip 02/16/2016     Malignant neoplasm of overlapping sites of bladder (H) 06/07/2023     Status post total right knee replacement 07/14/2013     Resolved Ambulatory Problems     Diagnosis Date Noted     No Resolved Ambulatory Problems     Past Medical History:   Diagnosis Date     Gout      Spider veins        Past surgical history:  Past Surgical History:   Procedure Laterality Date     BLADDER SURGERY N/A 05/10/2023    removal mass of urinary bladder        Social history:        Family history:  No family history on file.    Allergies:   Allergies   Allergen Reactions     Nitrofurantoin        Outpatient medications:     Current Facility-Administered Medications:      allopurinol (ZYLOPRIM) tablet 100 mg, 100 mg, Oral, Daily, Marzena Diaz MD, 100 mg at 07/18/23 1139     clonazePAM (klonoPIN) tablet 1 mg, 1 mg, Oral, Q24H, Marzena Diaz MD, 1 mg at 07/18/23 1422     colchicine (COLCRYS) tablet 1.2 mg, 1.2 mg, Oral, Daily PRN, Marzena Diaz MD     docusate sodium (COLACE) capsule 100 mg, 100 mg, Oral, BID, Marzena Diaz MD, 100 mg at 07/18/23 2013     gabapentin (NEURONTIN) capsule 300 mg, 300 mg, Oral, TID, Pedro Luis Arzola MD, 300 mg at 07/18/23 2013     hydrOXYzine (ATARAX) tablet 25 mg, 25 mg, Oral, TID PRN, Marzena Diaz MD     lidocaine (LMX4) cream, , Topical, Q1H PRN, Marzena Diaz MD     lidocaine 1 % 0.1-1 mL, 0.1-1 mL, Other, Q1H PRN, Marzena Diaz MD     LORazepam (ATIVAN) tablet 0.5 mg, 0.5 mg, Oral, Pre-Op/Pre-procedure x 1 dose, Marzena Diaz MD     melatonin tablet 1 mg, 1 mg, Oral, At Bedtime PRN, Marzena Diaz MD     naloxone (NARCAN) injection 0.2 mg, 0.2 mg, Intravenous, Q2 Min PRN **OR** naloxone (NARCAN) injection 0.4 mg, 0.4 mg, Intravenous, Q2 Min PRN **OR** naloxone (NARCAN) injection 0.2 mg, 0.2 mg, Intramuscular, Q2 Min PRN **OR** naloxone (NARCAN) injection 0.4 mg, 0.4 mg, Intramuscular, Q2 Min PRN, Court Dumont MD      oxyCODONE (ROXICODONE) tablet 5 mg, 5 mg, Oral, Q4H PRN, Marzena Diaz MD, 5 mg at 07/18/23 2013     simvastatin (ZOCOR) tablet 20 mg, 20 mg, Oral, At Bedtime, Marzena Diaz MD     sodium chloride (PF) 0.9% PF flush 3 mL, 3 mL, Intracatheter, Q8H, Marzena Diaz MD, 3 mL at 07/18/23 2013     sodium chloride (PF) 0.9% PF flush 3 mL, 3 mL, Intracatheter, q1 min prn, Marzena Diaz MD     sodium chloride 0.9 % bag TABLE SOLN, 1 Bag, TABLE SOLN, Continuous PRN, Farrukh Way MD

## 2023-07-19 NOTE — PLAN OF CARE
Goal Outcome Evaluation:    Px. drowsy, oriented x4. In and out of sleep the whole shift. Ambulated to the bathroom SBA - AO1 several times, with gait belt. Verbalized she have the urge to urinate, but voided only a very small amount. Nephrostomy bag drainage red in color, provider aware. No c/o pain this shift just a little discomfort from lying down. On O2 @ 1 Lpm, desaturating when she slept earlier. Continue with POC.

## 2023-07-19 NOTE — PROGRESS NOTES
I met with Dickgeoffrey briefly the evening of 7/18.  We discussed that the MRI from [facility] has significant vaginal invasion, which is why she is having vaginal/lower abdominal pain in addition to flank pain.      We discussed the best option for therapy would likely be immune therapy or concurrent chemoradiotherapy.      She noted she has significant difficulty getting to our appointments at the [facility], but lives 5 minutes from [facility].  She stated she would prefer to continue cares there due to convenience for her and her concern for leaving her sister alone for extended periods of time.      Referral made to med onc and rad onc at [facility].

## 2023-07-19 NOTE — PLAN OF CARE
Provider notified (name):jeffery Garcia  Reason for notification: patient pain not adequately controlled, can she have anything else for pain? also patient anxious/ tearful. anything for anxiety?  Recommendation/request given to provider: please advise  Response from provider:came to see patient

## 2023-07-19 NOTE — UTILIZATION REVIEW
"Admission Status; Secondary Review Determination         Under the authority of the Utilization Management Committee, the utilization review process indicated a secondary review on the above patient.  The review outcome is based on review of the medical records, discussions with staff, and applying clinical experience noted on the date of the review.          (x) Observation Status Appropriate - This patient does not meet hospital inpatient criteria and is placed in observation status. If this patient's primary payer is Medicare and was admitted as an inpatient, Condition Code 44 should be used and patient status changed to \"observation\".     RATIONALE FOR DETERMINATION     Nury's diseaseVandetrese Farooq Ruiz is a 68 year old female admitted on 7/18/2023. She has a history of urothelial carcinoma with muscular invasion complicated by urinary obstruction and hydronephrosis. She also has history of gout, dyslipidemia, and anxiety. She was admitted to the hospital for percutaneous nephrostomy tube placement which could not be done as an outpatient due to her living alone and not having assistance. Vital signs have been stable. Labs were remarkable only for creatinine 1.4. IR procedure is planned for today. Pain is managed with oral pain medications.     The severity of illness, intensity of service provided, expected LOS and risk for adverse outcome make the care appropriate for further observation; however, doesn't meet criteria for hospital inpatient admission. Dr Maxim Silverio was notified of this determination.    This document was produced using voice recognition software.      The information on this document is developed by the utilization review team in order for the business office to ensure compliance.  This only denotes the appropriateness of proper admission status and does not reflect the quality of care rendered.         The definitions of Inpatient Status and Observation Status used in " making the determination above are those provided in the CMS Coverage Manual, Chapter 1 and Chapter 6, section 70.4.      Sincerely,    Sorin Damina MD    Utilization Review  Physician Advisor  [facility].

## 2023-07-20 ENCOUNTER — PATIENT OUTREACH (OUTPATIENT)
Dept: ONCOLOGY | Facility: CLINIC | Age: 68
End: 2023-07-20
Payer: COMMERCIAL

## 2023-07-20 VITALS
DIASTOLIC BLOOD PRESSURE: 70 MMHG | OXYGEN SATURATION: 93 % | TEMPERATURE: 97.5 F | SYSTOLIC BLOOD PRESSURE: 117 MMHG | HEART RATE: 74 BPM | BODY MASS INDEX: 27.24 KG/M2 | RESPIRATION RATE: 16 BRPM | WEIGHT: 169.5 LBS | HEIGHT: 66 IN

## 2023-07-20 LAB
ANION GAP SERPL CALCULATED.3IONS-SCNC: 13 MMOL/L (ref 7–15)
BUN SERPL-MCNC: 15.8 MG/DL (ref 8–23)
CALCIUM SERPL-MCNC: 9.6 MG/DL (ref 8.8–10.2)
CHLORIDE SERPL-SCNC: 103 MMOL/L (ref 98–107)
CREAT SERPL-MCNC: 1.53 MG/DL (ref 0.51–0.95)
DEPRECATED HCO3 PLAS-SCNC: 22 MMOL/L (ref 22–29)
ERYTHROCYTE [DISTWIDTH] IN BLOOD BY AUTOMATED COUNT: 13.1 % (ref 10–15)
GFR SERPL CREATININE-BSD FRML MDRD: 36 ML/MIN/1.73M2
GLUCOSE SERPL-MCNC: 98 MG/DL (ref 70–99)
HCT VFR BLD AUTO: 39.4 % (ref 35–47)
HGB BLD-MCNC: 12.4 G/DL (ref 11.7–15.7)
MCH RBC QN AUTO: 30.5 PG (ref 26.5–33)
MCHC RBC AUTO-ENTMCNC: 31.5 G/DL (ref 31.5–36.5)
MCV RBC AUTO: 97 FL (ref 78–100)
PLATELET # BLD AUTO: 294 10E3/UL (ref 150–450)
POTASSIUM SERPL-SCNC: 4.2 MMOL/L (ref 3.4–5.3)
RBC # BLD AUTO: 4.07 10E6/UL (ref 3.8–5.2)
SODIUM SERPL-SCNC: 138 MMOL/L (ref 136–145)
WBC # BLD AUTO: 9.2 10E3/UL (ref 4–11)

## 2023-07-20 PROCEDURE — G0378 HOSPITAL OBSERVATION PER HR: HCPCS

## 2023-07-20 PROCEDURE — 250N000013 HC RX MED GY IP 250 OP 250 PS 637: Performed by: HOSPITALIST

## 2023-07-20 PROCEDURE — 250N000013 HC RX MED GY IP 250 OP 250 PS 637

## 2023-07-20 PROCEDURE — 36415 COLL VENOUS BLD VENIPUNCTURE: CPT

## 2023-07-20 PROCEDURE — 85027 COMPLETE CBC AUTOMATED: CPT

## 2023-07-20 PROCEDURE — 99239 HOSP IP/OBS DSCHRG MGMT >30: CPT | Mod: GC | Performed by: HOSPITALIST

## 2023-07-20 PROCEDURE — 80048 BASIC METABOLIC PNL TOTAL CA: CPT

## 2023-07-20 RX ORDER — AMOXICILLIN 250 MG
1 CAPSULE ORAL 2 TIMES DAILY
Qty: 30 TABLET | Refills: 3 | Status: SHIPPED | OUTPATIENT
Start: 2023-07-20

## 2023-07-20 RX ORDER — OXYCODONE HYDROCHLORIDE 5 MG/1
5 TABLET ORAL EVERY 6 HOURS PRN
Qty: 20 TABLET | Refills: 0 | Status: SHIPPED | OUTPATIENT
Start: 2023-07-20 | End: 2023-07-24

## 2023-07-20 RX ADMIN — OXYCODONE HYDROCHLORIDE 10 MG: 5 TABLET ORAL at 04:05

## 2023-07-20 RX ADMIN — OXYCODONE HYDROCHLORIDE 10 MG: 5 TABLET ORAL at 00:08

## 2023-07-20 RX ADMIN — GABAPENTIN 300 MG: 300 CAPSULE ORAL at 08:21

## 2023-07-20 RX ADMIN — DOCUSATE SODIUM 100 MG: 100 CAPSULE, LIQUID FILLED ORAL at 08:22

## 2023-07-20 RX ADMIN — OXYCODONE HYDROCHLORIDE 10 MG: 5 TABLET ORAL at 08:29

## 2023-07-20 RX ADMIN — ALLOPURINOL 100 MG: 100 TABLET ORAL at 08:22

## 2023-07-20 ASSESSMENT — ACTIVITIES OF DAILY LIVING (ADL)
ADLS_ACUITY_SCORE: 20

## 2023-07-20 NOTE — PROGRESS NOTES
Observation Goals:    -Diagnostic tests and consults completed and resulted: Met  -Vital signs normal or at patient baseline: Met  -Tolerating oral intake to maintain hydration: Met  -Adequate pain control on oral analgesics: Met

## 2023-07-20 NOTE — PROGRESS NOTES
[facility]: Cancer Care                                                                                          Called [employer] to have patient images from 5/2023-current pushed to [facility].      Cherri Escobar RN

## 2023-07-20 NOTE — PLAN OF CARE
Goal Outcome Evaluation:      Plan of Care Reviewed With: patient    Overall Patient Progress: no changeOverall Patient Progress: no change    Outcome Evaluation: VSS, pain well controlled with PRN oxy. Pt is discharging to home with family to provide transportation. Care coordinator is setting up Home Care referral this AM to help pt with PNT drain care at home. Pt to follow up at [facility] for further Oncology work up. PIV removed. Pt to  discharge meds at [facility]

## 2023-07-20 NOTE — PLAN OF CARE
"Goal Outcome Evaluation:       Time: 5690-3304    /62 (BP Location: Right arm)   Pulse 69   Temp 97.4  F (36.3  C) (Oral)   Resp 16   Ht 1.67 m (5' 5.75\")   Wt 76.9 kg (169 lb 8 oz)   SpO2 99%   BMI 27.57 kg/m      Reason for admission: Urothelial Carcinoma  Activity: SBA  Pain: Abdominal discomfort/bloating, PRN oxy given x3  Neuro: A&Ox4  Cardiac: WDL  Respiratory: WDL  GI/: LBM 7/18, voiding spontaneously  Diet: Regular  Lines: Right PIV-SL, Left PNT  Labs/imaging: AM labs pending      New changes this shift: PNT draining urosanguinous fluid, 200 mL of output. Order to flush tube every shift, no stopcock on PNT so unable to flush.     Plan: Observation goals met, possible discharge today.      Continue to monitor and follow POC                  "

## 2023-07-20 NOTE — DISCHARGE INSTRUCTIONS
[facility]    8:00am - 4:30pm M - F; Closed Saturday and Sunday  [phone] (phone)  [phone] (Fax)  [address]  [address] [place] [id number]

## 2023-07-20 NOTE — CONSULTS
"Care Management Discharge Note    Discharge Date: 07/20/2023       Discharge Disposition:  home    Discharge Services:  Home nursing    Discharge DME:  Tube site supplies (supplied by [facility])    Discharge Transportation:  Family or friend will provide    Private pay costs discussed: Not applicable    Does the patient's insurance plan have a 3 day qualifying hospital stay waiver?  No    PAS Confirmation Code:  n/a  Patient/family educated on Medicare website which has current facility and service quality ratings:  yes    Education Provided on the Discharge Plan:  yes  Persons Notified of Discharge Plans: patient, [facility]  Patient/Family in Agreement with the Plan:  yes    Handoff Referral Completed: Yes    Additional Information:  Per H&P: \"Abiel Ruiz is a 68 year old female admitted on 7/18/2023. She has a history of urothelial carcinoma with muscular invasion complicated by urinary obstruction and hydronephrosis. She is admitted for percutaneous nephrostomy tube placement due to limited outpatient support.\"    Patient discharging home with home nursing for management of a new percutaneous nephrostomy tube.     Home Health care:     [address]  [address] [place] [id number] [phone] (phone)  [phone] (Fax)        Karen Lyn, RN, BSN  7D RN Care Coordinator    [address] [address]  [email or web address]  Gender pronouns: she/her  Pager: [phone]        "

## 2023-07-20 NOTE — DISCHARGE SUMMARY
"[facility]  Discharge Summary - Medicine & Pediatrics       Date of Admission:  7/18/2023  Date of Discharge:  7/20/2023 10:50 AM  Discharging Provider: Pedro Luis Arzola MD  Discharge Service: Medicine Service, [facility] TEAM 3    Discharge Diagnoses   Muscle invasive urothelial carcinoma c/b urinary obstruction  S/p TURBT on 5/10/23  L Hydropnehrosis    Clinically Significant Risk Factors     # Overweight: Estimated body mass index is 27.57 kg/m  as calculated from the following:    Height as of this encounter: 1.67 m (5' 5.75\").    Weight as of this encounter: 76.9 kg (169 lb 8 oz).       Follow-ups Needed After Discharge   Follow-up Appointments     Follow Up and recommended labs and tests      Follow up with primary care provider, Marin Cintron MD, within 7 days to   evaluate treatment change and for hospital follow- up.  The following   labs/tests are recommended: CBC, BMP.      Follow-up with Oncology at [facility] as previously discussed.          Discharge Disposition   Discharged to home  Condition at discharge: Stable    Hospital Course   Abiel Ruiz was admitted on 7/18/2023 for L PNT placement.  The following problems were addressed during her hospitalization:    Muscle invasive urothelial carcinoma c/b urinary obstruction  S/p TURBT on 5/10  Patient follows with Dr. Kirkland. Recent diagnosis of invasive urothelial carcinoma with urinary obstruction and with moderate L hydroureteronephrosis requiring PNT placement. Outpatient PNT with sedation not possible given that Ms. Farooq Ruiz does not have help at home and depends on [employer] for [place]. Underwent PNT placement with IR on 7/19/23 without any immediate post-op complication.   - Follow-up with [facility] Oncology for further management  - Home health referral for assistance with PNT management  - Continue Oxycodone 5mg q4h PRN for pain (patient discharged with 5 day supply)  - " Continue Senna- Biscadoyl while taking opioid pain medication  - Continue Gabapentin 300mg TID     Chronic Medical Conditions  Gout- Continue Allopurinol, Colchicine  Anxiety- Continue Clonazepam, Hydroxyzine  Hyperlipidemia- Continue Simvastatin          Consultations This Hospital Stay   ADVANCE DIRECTIVE IP CONSULT  NURSING TO CONSULT FOR VASCULAR ACCESS CARE IP CONSULT  ONCOLOGY ADULT IP CONSULT  INTERVENTIONAL RADIOLOGY ADULT/PEDS IP CONSULT  PATIENT LEARNING CENTER IP CONSULT  CARE MANAGEMENT / SOCIAL WORK IP CONSULT  CARE MANAGEMENT / SOCIAL WORK IP CONSULT    Code Status   Prior       The patient was discussed with Dr. Arzola.    Marzena Diaz MD  [facility] 3 Service  [facility] [address] [address]  Phone: [phone]  ______________________________________________________________________    Physical Exam   Vital Signs: Temp: 97.5  F (36.4  C) Temp src: Oral BP: 117/70 Pulse: 74   Resp: 16 SpO2: 93 % O2 Device: None (Room air)    Weight: 169 lbs 8 oz  Physical Exam  Constitutional:       General: She is not in acute distress.  HENT:      Head: Normocephalic and atraumatic.   Eyes:      Extraocular Movements: Extraocular movements intact.      Conjunctiva/sclera: Conjunctivae normal.   Cardiovascular:      Rate and Rhythm: Normal rate and regular rhythm.   Pulmonary:      Effort: Pulmonary effort is normal.      Breath sounds: Normal breath sounds.   Abdominal:      General: There is no distension.      Palpations: Abdomen is soft.      Tenderness: There is no abdominal tenderness.      Comments: L PNT in place draining serosanguinous fluid, non tender.   Musculoskeletal:      Right lower leg: No edema.      Left lower leg: No edema.   Skin:     General: Skin is warm and dry.   Neurological:      General: No focal deficit present.      Mental Status: She is alert and oriented to person, place, and time.   Psychiatric:         Mood and Affect: Mood normal.          Behavior: Behavior normal.       Primary Care Physician   Marin Cintron MD    Discharge Orders      CBC with platelets     Basic metabolic panel     Home Care Referral      Reason for your hospital stay    You were admitted to the hospital for placement of a percutaneous nephrostomy tube to help drain urine from your kidney and avoid kidney injury. You underwent this procedure on 7/19 without complication.    Upon discharge, please continue to care for your nephrostomy tube as demonstrated during admission. If you have any worsening pain in your kidney, increasing blood in your urine, nausea, vomiting, or fevers please return to the ED for further evaluation.     Activity    Your activity upon discharge: activity as tolerated     Follow Up and recommended labs and tests    Follow up with primary care provider, Marin Cintron MD, within 7 days to evaluate treatment change and for hospital follow- up.  The following labs/tests are recommended: CBC, BMP.      Follow-up with Oncology at [facility] as previously discussed.     Diet    Follow this diet upon discharge: regular diet       Significant Results and Procedures   Most Recent 3 CBC's:  Recent Labs   Lab Test 07/20/23  0744 07/19/23  1833 07/19/23  0533   WBC 9.2 8.1 10.7   HGB 12.4 11.8 12.6   MCV 97 97 97    293 329     Most Recent 3 BMP's:  Recent Labs   Lab Test 07/20/23  0744 07/19/23  1833 07/19/23  0533    139 138   POTASSIUM 4.2 4.4 3.7   CHLORIDE 103 105 102   CO2 22 22 23   BUN 15.8 14.4 16.6   CR 1.53* 1.48* 1.44*   ANIONGAP 13 12 13   AURELIA 9.6 9.4 9.8   GLC 98 88 96   ,   Results for orders placed or performed during the hospital encounter of 07/18/23   IR Nephrostomy Tube Placement Left    Narrative    PROCEDURE: Genitourinary catheter placement, left percutaneous  nephrostomy tube    Procedural Personnel  Attending physician(s): Rafa Pham MD  Fellow physician(s): None  Resident physician(s): Tk Chisholm MD  Advanced  practice provider(s): None    Pre-procedure diagnosis: Left ureteral obstruction; left  hydroureteronephrosis  Post-procedure diagnosis: Same  Indication: Urinary obstruction  No  Additional clinical history: Pt in consultation with urology for  possible future total cystectomy.    Complications: No immediate complications.      Impression    IMPRESSION:    Left nephrostomy tube placement.    Plan:   1. Return to inpatient unit for 1 hour strict bedrest followed by 1  hour bedrest with bathroom privileges with assist. ?Monitor  nephrostomy tube for gross hematuria.  2. Resume pre-procedure orders at the discretion of the primary team.  3. Flush nephrostomy tube every shift with 10 mL normal saline.  ?Perform until urine is no longer bloody, then ok to discontinue  flushes once urine is clear  4. Return to IR in 90 days for routine PNT check and exchange.  _______________________________________________________________    PROCEDURE SUMMARY  - Target organ: Unilateral native kidney (left)  - Image-guided placement of genitourinary catheter(s)  - Additional procedure(s): None    PROCEDURE DETAILS:    Pre-procedure  Consent: Informed consent for the procedure including risks, benefits  and alternatives was obtained and time-out was performed prior to the  procedure.  Preparation: The site was prepared and draped using maximal sterile  barrier technique including cutaneous antisepsis.    Anesthesia/sedation  Level of anesthesia/sedation: Moderate sedation (conscious sedation)  Anesthesia/sedation administered by: Independent trained observer  under attending supervision with continuous monitoring of the  patient?s level of consciousness and physiologic status  Total intra-service sedation time (minutes): 29    Genitourinary catheter placement  Side:Left native  Local anesthesia was administered. A needle was advanced into an  interpolar calyx under ultrasound and fluoroscopy guidance. A wire was  advanced, the tract was  serially dilated and a nephrostomy tube was  placed  . Contrast injection was performed.  Genitourinary catheter placed: 10 Fr. 35 cm locking pigtail SKATER  catheter   Findings: No concerning findings  External catheter securement: Non-absorbable suture    Contrast  Contrast agent: Visipaque 320  Contrast volume (mL): 5    Radiation Dose  Fluoroscopy time (minutes): 2.4    Reference air kerma (mGy): 7.6   Kerma area product (uGy-m2): 118.85     Additional Details  Additional description of procedure: None  Registry event: V/3/f  Device used: Not applicable  Equipment details: None  Specimens removed: None. A sample was not sent for analysis.  Estimated blood loss (mL): Less than 10  Standardized report: SIR_GUCatheterPlacement_v3.1    Attestation  Signer name: MCKENNA RICHARDSON MD  I attest that I was present for the entire procedure. I reviewed the  stored images and agree with the report as written.      I have personally reviewed the examination and initial interpretation  and I agree with the findings.    MCKENNA RICHARDSON MD         SYSTEM ID:  [id number]       Discharge Medications   Discharge Medication List as of 7/20/2023 10:02 AM      START taking these medications    Details   oxyCODONE (ROXICODONE) 5 MG tablet Take 1 tablet (5 mg) by mouth every 6 hours as needed for pain, Disp-20 tablet, R-0, E-Prescribe      senna-docusate (SENOKOT-S/PERICOLACE) 8.6-50 MG tablet Take 1 tablet by mouth 2 times daily . Hold for loose stools., Disp-30 tablet, R-3, E-Prescribe         CONTINUE these medications which have NOT CHANGED    Details   allopurinol (ZYLOPRIM) 100 MG tablet Take 1 tablet (100 mg) by mouth daily, Disp-30 tablet, R-4, E-Prescribe      clonazePAM (KLONOPIN) 1 MG tablet Take 1 tablet by mouth daily at 2 pm, Historical      colchicine (COLCRYS) 0.6 MG tablet colchicine 0.6 mg tablet   TAKE 2 TABS BY MOUTH WITH GOUT FLARE. MAY TAKE 2ND TABLET 1HR LATER, THEN 1 TAB EVERY DAY AS NEEDED, Historical       gabapentin (NEURONTIN) 600 MG tablet Take 300 mg by mouth 3 times daily, Historical      HYDROcodone-acetaminophen (NORCO) 5-325 MG tablet Take 1 tablet by mouth every 4 hours as needed for severe pain, Disp-84 tablet, R-0, E-Prescribe      hydrOXYzine (VISTARIL) 25 MG capsule hydroxyzine pamoate 25 mg capsule   TAKE 1 CAP BY MOUTH THREE TIMES DAILY AS NEEDED. CAN TAKE 2 CAPS NIGHTLY AS NEEDED FOR SLEEP, Historical      naloxone (NARCAN) 4 MG/0.1ML nasal spray Spray 1 spray (4 mg) into one nostril alternating nostrils as needed for opioid reversal every 2-3 minutes until assistance arrives, Disp-0.2 mL, R-3, E-Prescribe      simvastatin (ZOCOR) 20 MG tablet simvastatin 20 mg tablet   TAKE 1 TABLET BY MOUTH ONCE A DAY FOR CHOLESTEROL, Historical         STOP taking these medications       docusate sodium (COLACE) 100 MG capsule Comments:   Reason for Stopping:             Allergies   Allergies   Allergen Reactions     Nitrofurantoin

## 2023-07-23 ENCOUNTER — PATIENT OUTREACH (OUTPATIENT)
Dept: CARE COORDINATION | Facility: CLINIC | Age: 68
End: 2023-07-23
Payer: COMMERCIAL

## 2023-07-23 NOTE — PROGRESS NOTES
Connected Care Resource Center Contact  [facility]/Voicemail     Clinical Data: Transitional Care Management Outreach     Outreach attempted x 2.  No answer.     Plan:  [facility] will do no further outreaches at this time.       Breanna Avelar MA  [facility] Care Resource [facility]    *Connected Care Resource Team does NOT follow patient ongoing. Referrals are identified based on internal discharge reports and the outreach is to ensure patient has an understanding of their discharge instructions.

## 2023-07-24 ENCOUNTER — APPOINTMENT (OUTPATIENT)
Dept: LAB | Facility: CLINIC | Age: 68
End: 2023-07-24
Attending: STUDENT IN AN ORGANIZED HEALTH CARE EDUCATION/TRAINING PROGRAM
Payer: COMMERCIAL

## 2023-07-24 ENCOUNTER — ONCOLOGY VISIT (OUTPATIENT)
Dept: ONCOLOGY | Facility: CLINIC | Age: 68
End: 2023-07-24
Attending: STUDENT IN AN ORGANIZED HEALTH CARE EDUCATION/TRAINING PROGRAM
Payer: COMMERCIAL

## 2023-07-24 VITALS
BODY MASS INDEX: 27.89 KG/M2 | RESPIRATION RATE: 18 BRPM | HEART RATE: 70 BPM | SYSTOLIC BLOOD PRESSURE: 113 MMHG | WEIGHT: 171.5 LBS | OXYGEN SATURATION: 96 % | DIASTOLIC BLOOD PRESSURE: 66 MMHG | TEMPERATURE: 98.7 F

## 2023-07-24 DIAGNOSIS — N32.0: ICD-10-CM

## 2023-07-24 DIAGNOSIS — C68.9 UROTHELIAL CARCINOMA (H): ICD-10-CM

## 2023-07-24 LAB
ANION GAP SERPL CALCULATED.3IONS-SCNC: 10 MMOL/L (ref 7–15)
BUN SERPL-MCNC: 13.5 MG/DL (ref 8–23)
CALCIUM SERPL-MCNC: 9.8 MG/DL (ref 8.8–10.2)
CHLORIDE SERPL-SCNC: 104 MMOL/L (ref 98–107)
CREAT SERPL-MCNC: 1.43 MG/DL (ref 0.51–0.95)
DEPRECATED HCO3 PLAS-SCNC: 27 MMOL/L (ref 22–29)
ERYTHROCYTE [DISTWIDTH] IN BLOOD BY AUTOMATED COUNT: 12.8 % (ref 10–15)
GFR SERPL CREATININE-BSD FRML MDRD: 40 ML/MIN/1.73M2
GLUCOSE SERPL-MCNC: 91 MG/DL (ref 70–99)
HCT VFR BLD AUTO: 36.5 % (ref 35–47)
HGB BLD-MCNC: 11.6 G/DL (ref 11.7–15.7)
MCH RBC QN AUTO: 30.3 PG (ref 26.5–33)
MCHC RBC AUTO-ENTMCNC: 31.8 G/DL (ref 31.5–36.5)
MCV RBC AUTO: 95 FL (ref 78–100)
PLATELET # BLD AUTO: 317 10E3/UL (ref 150–450)
POTASSIUM SERPL-SCNC: 3.9 MMOL/L (ref 3.4–5.3)
RBC # BLD AUTO: 3.83 10E6/UL (ref 3.8–5.2)
SODIUM SERPL-SCNC: 141 MMOL/L (ref 136–145)
WBC # BLD AUTO: 9.4 10E3/UL (ref 4–11)

## 2023-07-24 PROCEDURE — 99215 OFFICE O/P EST HI 40 MIN: CPT | Performed by: STUDENT IN AN ORGANIZED HEALTH CARE EDUCATION/TRAINING PROGRAM

## 2023-07-24 PROCEDURE — 36415 COLL VENOUS BLD VENIPUNCTURE: CPT | Performed by: STUDENT IN AN ORGANIZED HEALTH CARE EDUCATION/TRAINING PROGRAM

## 2023-07-24 PROCEDURE — 85027 COMPLETE CBC AUTOMATED: CPT | Performed by: STUDENT IN AN ORGANIZED HEALTH CARE EDUCATION/TRAINING PROGRAM

## 2023-07-24 PROCEDURE — 82310 ASSAY OF CALCIUM: CPT | Performed by: STUDENT IN AN ORGANIZED HEALTH CARE EDUCATION/TRAINING PROGRAM

## 2023-07-24 PROCEDURE — G0463 HOSPITAL OUTPT CLINIC VISIT: HCPCS | Performed by: STUDENT IN AN ORGANIZED HEALTH CARE EDUCATION/TRAINING PROGRAM

## 2023-07-24 RX ORDER — OXYCODONE HYDROCHLORIDE 5 MG/1
5-10 TABLET ORAL EVERY 6 HOURS PRN
Qty: 112 TABLET | Refills: 0 | Status: SHIPPED | OUTPATIENT
Start: 2023-07-24 | End: 2023-08-07

## 2023-07-24 ASSESSMENT — PAIN SCALES - GENERAL: PAINLEVEL: SEVERE PAIN (6)

## 2023-07-24 NOTE — NURSING NOTE
"Oncology Rooming Note    July 24, 2023 1:29 PM   Abiel Ruiz is a 68 year old female who presents for:    Chief Complaint   Patient presents with     Blood Draw     Vitals, blood drawn via VPT by LPN. Pt checked into appt.      Oncology Clinic Visit     Urothelial carcinoma, Malignant neoplasm of overlapping sites of bladder      Initial Vitals: /66   Pulse 70   Temp 98.7  F (37.1  C) (Oral)   Resp 18   Wt 77.8 kg (171 lb 8 oz)   SpO2 96%   BMI 27.89 kg/m   Estimated body mass index is 27.89 kg/m  as calculated from the following:    Height as of 7/18/23: 1.67 m (5' 5.75\").    Weight as of this encounter: 77.8 kg (171 lb 8 oz). Body surface area is 1.9 meters squared.  Severe Pain (6) Comment: Data Unavailable   No LMP recorded. Patient is postmenopausal.  Allergies reviewed: Yes  Medications reviewed: Yes    Medications: Medication refills not needed today.  Pharmacy name entered into [employer]: [id number] [address] [place] [address]    Clinical concerns: Patient isn't sure if she needs any refills. Patient is concerned about her \"kidney tube\" near her lower back area and she wants to the provider to take a look at it.  Dr. Kirkland was NOT notified.      Jami Freire              "

## 2023-07-24 NOTE — LETTER
7/24/2023         RE: Abiel Ruiz  [address]  [facility] [address]        Dear Colleague,    Thank you for referring your patient, Abiel Ruiz, to the [facility]. Please see a copy of my visit note below.      [facility] Medical Oncology Second Opinion Consultation Note       Date of visit: July 24, 2023    INTERVAL HISTORY: Abdominal pain is significantly better after PNT placement. PNT was placed uneventfully with moderate sedation inpatient.  Pelvic/vaginal pain is still significant and taking 1 oxycodone every 6 hours as directed from inpt discharge team.  Appetite isn't great.  She is looking forward to getting cares closer to home at [facility].  PNT is in place and she is scheduled to have HHN come by this afternoon with PNT cares.      ONCOLOGY HISTORY:  2/2023-Initial hematuria with clot.    2/17/2023-Pelvic US with note of fibroid.   4/21/2023-Initial urology appt with Dr. Hancock   5/2/2023-CT urogram with note of L sided hydronephrosis and fat density at [facility]. Note of prostate in formal read, which was later edited.  This may be concerning for metastatic disease.    5/10/2023-Cystoscopy with TURBT-L ureteral orifice not visible.  Op report notes dense, non-papillary mass involving majority of the trigone to the neck of the bladder.  Some of the tumor was reported to be removed, but it sounds like significant tumor remained post resection due to the technical difficulty.  Tumor up to 5cm There was a note in the report that there may be a mass (also thought by Dr. Hancock to be metastatic disease).  L PNT was recommended.  Pathology notable for invasive, high grade urothelial carcinoma with muscle invasion. LVI not present.  Block A2 recommended for testing.   6/1/23-Referral to Dr. Rosales at [facility] for consideration of cystectomy.  Recommendation made for med onc for neoadjuvant therapy consideration and for PNT placement again.    6/7/23-Initial med onc appointment.  Will proceed with staging using PET CT and recommend PNT placement.  IR referral made for L PNT.  Repeat renal US with L hydronephrosis.    6/21/23-PET CT with a few indeterminate pelvic LN and mass involving or near bladder. MR pelvis recommended.  Will order at 6/26/23 appt.   6/26/23-Discussed PET scan results and mass near bladder that was not well-defined/need for MR pelvis.  She prefers open MR which was sent to [facility].  She was more amenable to PNT placement, which I discussed with IR (plan for moderate sedation rather than GA per our conversation due to coordination difficulties).  Return in two weeks with imaging and hopefully after PNT for eval of renal function/pain management.    7/10/23-Discussed with medicine triage regarding admission.  She is on the list for admission for PNT placement.  She does not have help at home and depends on metro mobility for transportation, making outpt with moderate sedation not possible.  7/20/23-PNT placed inpt.  MR from [facility] is complete and notable for vaginal invasion of the primary bladder mass in formal report.    7/24/23-Return visit to check-in after PNT placement.  She does not wish for cystectomy/pelvic exenteration, consistent with her previous discussions.  Mentioned radiation +/- chemo would likely be the quickest to control her vaginal pain or immunotherapy, which would act more slowly.  In the process of transferring cares to [facility] for her convenience.      GENOMIC STUDIES: None currently.  Recommend Caris testing of bladder tumor. Block A2 preferred.  We have not been able to discuss consent as of yet due to other staging conversations, pain management, and PNT discussions.      TREATMENT HISTORY:   TURBT 5/10/23    GUIDELINES USED: NCCN    INTENT OF THERAPY: Palliative.  Discussed on evening of 7/19 inpatient and again on 7/24 that this would be eventually fatal and our treatment goals are to slow and control, but we  will never fully cure or rid her of the cancer.      CLINICAL TRIALS:  None    ALLERGIES: Nitrofurantoin     MEDS:  Current Outpatient Medications   Medication Instructions    diphenhydrAMINE-acetaminophen (TYLENOL PM)  MG tablet 1 tablet, Oral, AT BEDTIME PRN    gabapentin (NEURONTIN) 600 mg, Oral, 3 TIMES DAILY    HYDROcodone-acetaminophen (NORCO) 5-325 MG tablet 1 tablet, Oral, EVERY 6 HOURS PRN    HYDROcodone-acetaminophen (NORCO) 5-325 MG tablet 1 tablet, Oral, EVERY 6 HOURS PRN    hydrOXYzine (VISTARIL) 25 MG capsule hydroxyzine pamoate 25 mg capsule   TAKE 1 CAP BY MOUTH THREE TIMES DAILY AS NEEDED. CAN TAKE 2 CAPS NIGHTLY AS NEEDED FOR SLEEP    simvastatin (ZOCOR) 20 MG tablet simvastatin 20 mg tablet   TAKE 1 TABLET BY MOUTH ONCE A DAY FOR CHOLESTEROL     ROS-Remainder of 14 point ROS reviewed and negative except as in HPI.    PHYSICAL EXAM:  ECOG-PS=1    /66   Pulse 70   Temp 98.7  F (37.1  C) (Oral)   Resp 18   Wt 77.8 kg (171 lb 8 oz)   SpO2 96%   BMI 27.89 kg/m    Exam:  Constitutional: healthy, alert and mild distress  Head: Normocephalic. No masses or lesions.   Neck: Neck supple. No adenopathy grossly.   ENT: ENT exam normal, no neck nodes or sinus tenderness  Cardiovascular: negative, No edema or JVD.  Respiratory: negative, normal WOB on RA, no wheezing or rhonchi   Gastrointestinal: Abdomen soft, mild tenderness, flank tenderness present on L.   : Deferred  Musculoskeletal: extremities normal- no gross deformities noted and normal muscle tone  Skin: no suspicious lesions or rashes on exposed areas of skin. L flank PNT in place with CDI dressing.  Draining clear yellow urine.    Neurologic: CNII-XII grossly intact, normal speech and mentation.   Psychiatric: mentation appears normal, anxious and worried/tearful.   Hematologic/Lymphatic/Immunologic: no grossly enlarged cervical LN.     LABS AND IMAGES:    CT urogram from [facility]. Personally reviewed. Clear L sided  hydronephrosis.  No overt LAD.  Bladder is obscured by hip prosthesis artifact, although there does appear to be a deep pelvic mass that I cannot identify.      Renal US at [facility] 6/7/23. Notable for L sided hydronephrosis.     PET from [facility] 6/21/23. Personally reviewed. Some indeterminate abdominal LN.  No bone mets or mets in chest.  Hydronephrosis again redemonstrated.  Mass in or near bladder is ill defined.  Recommendation for MR pelvis from radiology.      MR pelvis from [facility].  Note of vaginal invasion of bladder mass.  No pelvic adenopathy.  No other evidence of distant metastases.      IMPRESSION AND PLAN:    # muscle-invasive urothelial carcinoma, pending staging.   Abiel presented for initial medical oncology consultation and possible consideration of neoadjuvant chemotherapy for muscle invasive urothelial carcinoma.  She initially had hematuria with clot in February 2023 but it does not seem like she had been evaluated until at least April 2023.  She underwent cystoscopy with TURBT at [facility] 5/10/2023 with note of a significant tumor in the bladder trigone to the bladder neck which sounds like it was unable to be completely removed due to size location and invasiveness.  This was at least 5 cm per the report and pathology was notable for muscle invasive urothelial carcinoma.  CT urogram prior to this visit was notable for a possible mass blocking the left ureterovesical junction, which was also noted to not be visible on cystoscopy and precluded ureteral stent placement.  Notably there was an additional mass potentially posterior to the bladder which was initially interpreted as the prostate by the reading radiologist, but this appears to be another mass.  The etiology of this mass is unclear as she also has a history of endometriosis.  Unfortunately much of the pelvis is obscured by her right hip prosthesis on the CT study.  She saw Dr. Adler on 6/1/2023 for consideration of a  cystectomy.    At her initial appointment on 6/7/2023, we discussed the basis for cancer treatment and the distinction between curative and palliative intent.  Unfortunately we did not have all the imaging and staging information we need to have that full discussion.  We discussed the basics of urothelial carcinoma including the differences between muscle invasive and nonmuscle invasive and the rationale for cystectomy if this is isolated to the bladder itself and muscle invasive.  We discussed the approximate 10% benefit of neoadjuvant chemotherapy use prior to cystectomy and the fact that this would be curative intent.  Unfortunately I also did  have concerns for possible metastatic versus locally advanced disease based on the indeterminate mass from the CT urogram.  I recommended a PET CT for further evaluation and staging in order to determine eligibility for cystectomy, which was notable for no bony or gordy disease, but there was concern for a mass in or near the bladder, which was obscured by her hip replacement.  She did again endorse that she does not want a cystectomy at this and following visits.      She had PNT placed 7/19/23 at [facility] with moderate sedation and no issues with this.  [facility] from [facility] has resulted and been scanned in.  Unfortunately the mass inferior to the bladder is clearly invading into the vaginal wall.  While surgery would be an option with pelvic exenteration, she declined to consider major surgery at several of our appointments, including 7/24/23.  She has requested transfer of cares to [facility] given its location a few minutes from her house rather than across town to the [place].  I have reached out to the [facility] oncology team and have made a referral for her to be seen urgently to start therapy (Med onc and rad onc both referred).  I think that chemoradiation would have a better ability to control her pain in the acute setting, but Pembrolizumab immune  therapy remains an option as well.  We discussed these options, but I have not placed prior authorizations for anything given her transfer of cares.  She will discuss these options.     She is still having significant vaginal pain, but L flank pain is improved with PNT placement.  She is setup to have home nursing stop by the afternoon of 7/24 for assistance in managing PNT.  I provided her with an additional 2 week supply of oxycodone until she can establish care at [facility].  I have added a follow-up video appointment in a month to check in.  If she is established at [facility], then we can cancel this.  I just do not want her to fall through the cracks.  MD at [facility] would then take over primary oncology cares and pain management, which we discussed on 7/24/23.      PLAN:   Please follow up with the referral for [facility].  Here is their phone number: [phone]  Please try to see them within the next two weeks to transfer your cares.  We are working on getting your images sent to them so they can view them.    I have refilled your oxycodone for another two weeks while you transition.    You can see me back by video in a month.  If you are established with an oncologist at that time you do not need to see me and can cancel.     A total of 40 minutes were spent on this patient on the day of the encounter, of which more than 50% of this time was used for counseling and coordination of care.  The patient was given the opportunity to ask multiple questions today, all of which were answered to their satisfaction.      Tk Kirkland MD, PhD   of Medicine   Oncology/BMT/Cellular Therapies

## 2023-07-24 NOTE — NURSING NOTE
Chief Complaint   Patient presents with    Blood Draw     Vitals, blood drawn via VPT by LPN. Pt checked into appt.      SHREYA Xie LPN

## 2023-07-24 NOTE — PROGRESS NOTES
[facility] Medical Oncology Second Opinion Consultation Note       Date of visit: July 24, 2023    INTERVAL HISTORY: Abdominal pain is significantly better after PNT placement. PNT was placed uneventfully with moderate sedation inpatient.  Pelvic/vaginal pain is still significant and taking 1 oxycodone every 6 hours as directed from inpt discharge team.  Appetite isn't great.  She is looking forward to getting cares closer to home at [facility].  PNT is in place and she is scheduled to have HHN come by this afternoon with PNT cares.      ONCOLOGY HISTORY:  2/2023-Initial hematuria with clot.    2/17/2023-Pelvic US with note of fibroid.   4/21/2023-Initial urology appt with Dr. Hancock   5/2/2023-CT urogram with note of L sided hydronephrosis and fat density at [facility]. Note of prostate in formal read, which was later edited.  This may be concerning for metastatic disease.    5/10/2023-Cystoscopy with TURBT-L ureteral orifice not visible.  Op report notes dense, non-papillary mass involving majority of the trigone to the neck of the bladder.  Some of the tumor was reported to be removed, but it sounds like significant tumor remained post resection due to the technical difficulty.  Tumor up to 5cm There was a note in the report that there may be a mass (also thought by Dr. Hancock to be metastatic disease).  L PNT was recommended.  Pathology notable for invasive, high grade urothelial carcinoma with muscle invasion. LVI not present.  Block A2 recommended for testing.   6/1/23-Referral to Dr. Rosales at [facility] for consideration of cystectomy.  Recommendation made for med onc for neoadjuvant therapy consideration and for PNT placement again.   6/7/23-Initial med onc appointment.  Will proceed with staging using PET CT and recommend PNT placement.  IR referral made for L PNT.  Repeat renal US with L hydronephrosis.    6/21/23-PET CT with a few indeterminate pelvic LN and mass involving or near bladder. MR pelvis  recommended.  Will order at 6/26/23 appt.   6/26/23-Discussed PET scan results and mass near bladder that was not well-defined/need for MR pelvis.  She prefers open MR which was sent to [facility].  She was more amenable to PNT placement, which I discussed with IR (plan for moderate sedation rather than GA per our conversation due to coordination difficulties).  Return in two weeks with imaging and hopefully after PNT for eval of renal function/pain management.    7/10/23-Discussed with medicine triage regarding admission.  She is on the list for admission for PNT placement.  She does not have help at home and depends on metro mobility for transportation, making outpt with moderate sedation not possible.  7/20/23-PNT placed inpt.  MR from [facility] is complete and notable for vaginal invasion of the primary bladder mass in formal report.    7/24/23-Return visit to check-in after PNT placement.  She does not wish for cystectomy/pelvic exenteration, consistent with her previous discussions.  Mentioned radiation +/- chemo would likely be the quickest to control her vaginal pain or immunotherapy, which would act more slowly.  In the process of transferring cares to [facility] for her convenience.      GENOMIC STUDIES: None currently.  Recommend Caris testing of bladder tumor. Block A2 preferred.  We have not been able to discuss consent as of yet due to other staging conversations, pain management, and PNT discussions.      TREATMENT HISTORY:   TURBT 5/10/23    GUIDELINES USED: NCCN    INTENT OF THERAPY: Palliative.  Discussed on evening of 7/19 inpatient and again on 7/24 that this would be eventually fatal and our treatment goals are to slow and control, but we will never fully cure or rid her of the cancer.      CLINICAL TRIALS:  None    ALLERGIES: Nitrofurantoin     MEDS:  Current Outpatient Medications   Medication Instructions    diphenhydrAMINE-acetaminophen (TYLENOL PM)  MG tablet 1 tablet, Oral, AT BEDTIME PRN     gabapentin (NEURONTIN) 600 mg, Oral, 3 TIMES DAILY    HYDROcodone-acetaminophen (NORCO) 5-325 MG tablet 1 tablet, Oral, EVERY 6 HOURS PRN    HYDROcodone-acetaminophen (NORCO) 5-325 MG tablet 1 tablet, Oral, EVERY 6 HOURS PRN    hydrOXYzine (VISTARIL) 25 MG capsule hydroxyzine pamoate 25 mg capsule   TAKE 1 CAP BY MOUTH THREE TIMES DAILY AS NEEDED. CAN TAKE 2 CAPS NIGHTLY AS NEEDED FOR SLEEP    simvastatin (ZOCOR) 20 MG tablet simvastatin 20 mg tablet   TAKE 1 TABLET BY MOUTH ONCE A DAY FOR CHOLESTEROL     ROS-Remainder of 14 point ROS reviewed and negative except as in HPI.    PHYSICAL EXAM:  ECOG-PS=1    /66   Pulse 70   Temp 98.7  F (37.1  C) (Oral)   Resp 18   Wt 77.8 kg (171 lb 8 oz)   SpO2 96%   BMI 27.89 kg/m    Exam:  Constitutional: healthy, alert and mild distress  Head: Normocephalic. No masses or lesions.   Neck: Neck supple. No adenopathy grossly.   ENT: ENT exam normal, no neck nodes or sinus tenderness  Cardiovascular: negative, No edema or JVD.  Respiratory: negative, normal WOB on RA, no wheezing or rhonchi   Gastrointestinal: Abdomen soft, mild tenderness, flank tenderness present on L.   : Deferred  Musculoskeletal: extremities normal- no gross deformities noted and normal muscle tone  Skin: no suspicious lesions or rashes on exposed areas of skin. L flank PNT in place with CDI dressing.  Draining clear yellow urine.    Neurologic: CNII-XII grossly intact, normal speech and mentation.   Psychiatric: mentation appears normal, anxious and worried/tearful.   Hematologic/Lymphatic/Immunologic: no grossly enlarged cervical LN.     LABS AND IMAGES:    CT urogram from [facility]. Personally reviewed. Clear L sided hydronephrosis.  No overt LAD.  Bladder is obscured by hip prosthesis artifact, although there does appear to be a deep pelvic mass that I cannot identify.      Renal US at [facility] 6/7/23. Notable for L sided hydronephrosis.     PET from [facility] 6/21/23. Personally reviewed. Some  indeterminate abdominal LN.  No bone mets or mets in chest.  Hydronephrosis again redemonstrated.  Mass in or near bladder is ill defined.  Recommendation for MR pelvis from radiology.      MR pelvis from [facility].  Note of vaginal invasion of bladder mass.  No pelvic adenopathy.  No other evidence of distant metastases.      IMPRESSION AND PLAN:    # muscle-invasive urothelial carcinoma, pending staging.   Abiel presented for initial medical oncology consultation and possible consideration of neoadjuvant chemotherapy for muscle invasive urothelial carcinoma.  She initially had hematuria with clot in February 2023 but it does not seem like she had been evaluated until at least April 2023.  She underwent cystoscopy with TURBT at [facility] 5/10/2023 with note of a significant tumor in the bladder trigone to the bladder neck which sounds like it was unable to be completely removed due to size location and invasiveness.  This was at least 5 cm per the report and pathology was notable for muscle invasive urothelial carcinoma.  CT urogram prior to this visit was notable for a possible mass blocking the left ureterovesical junction, which was also noted to not be visible on cystoscopy and precluded ureteral stent placement.  Notably there was an additional mass potentially posterior to the bladder which was initially interpreted as the prostate by the reading radiologist, but this appears to be another mass.  The etiology of this mass is unclear as she also has a history of endometriosis.  Unfortunately much of the pelvis is obscured by her right hip prosthesis on the CT study.  She saw Dr. Adler on 6/1/2023 for consideration of a cystectomy.    At her initial appointment on 6/7/2023, we discussed the basis for cancer treatment and the distinction between curative and palliative intent.  Unfortunately we did not have all the imaging and staging information we need to have that full discussion.  We discussed the  basics of urothelial carcinoma including the differences between muscle invasive and nonmuscle invasive and the rationale for cystectomy if this is isolated to the bladder itself and muscle invasive.  We discussed the approximate 10% benefit of neoadjuvant chemotherapy use prior to cystectomy and the fact that this would be curative intent.  Unfortunately I also did  have concerns for possible metastatic versus locally advanced disease based on the indeterminate mass from the CT urogram.  I recommended a PET CT for further evaluation and staging in order to determine eligibility for cystectomy, which was notable for no bony or gordy disease, but there was concern for a mass in or near the bladder, which was obscured by her hip replacement.  She did again endorse that she does not want a cystectomy at this and following visits.      She had PNT placed 7/19/23 at [facility] with moderate sedation and no issues with this.  MR pelvis from [facility] has resulted and been scanned in.  Unfortunately the mass inferior to the bladder is clearly invading into the vaginal wall.  While surgery would be an option with pelvic exenteration, she declined to consider major surgery at several of our appointments, including 7/24/23.  She has requested transfer of cares to [facility] given its location a few minutes from her house rather than across town to the [place].  I have reached out to the [facility] oncology team and have made a referral for her to be seen urgently to start therapy (Med onc and rad onc both referred).  I think that chemoradiation would have a better ability to control her pain in the acute setting, but Pembrolizumab immune therapy remains an option as well.  We discussed these options, but I have not placed prior authorizations for anything given her transfer of cares.  She will discuss these options.     She is still having significant vaginal pain, but L flank pain is improved with PNT placement.  She is  setup to have home nursing stop by the afternoon of 7/24 for assistance in managing PNT.  I provided her with an additional 2 week supply of oxycodone until she can establish care at [facility].  I have added a follow-up video appointment in a month to check in.  If she is established at [facility], then we can cancel this.  I just do not want her to fall through the cracks.  MD at [facility] would then take over primary oncology cares and pain management, which we discussed on 7/24/23.      PLAN:   Please follow up with the referral for [facility].  Here is their phone number: [phone]  Please try to see them within the next two weeks to transfer your cares.  We are working on getting your images sent to them so they can view them.    I have refilled your oxycodone for another two weeks while you transition.    You can see me back by video in a month.  If you are established with an oncologist at that time you do not need to see me and can cancel.     A total of 40 minutes were spent on this patient on the day of the encounter, of which more than 50% of this time was used for counseling and coordination of care.  The patient was given the opportunity to ask multiple questions today, all of which were answered to their satisfaction.      Tk Kirkland MD, PhD   of Medicine   Oncology/BMT/Cellular Therapies

## 2023-07-25 ENCOUNTER — NURSE TRIAGE (OUTPATIENT)
Dept: ONCOLOGY | Facility: CLINIC | Age: 68
End: 2023-07-25
Payer: COMMERCIAL

## 2023-07-25 NOTE — TELEPHONE ENCOUNTER
Nurse Triage SBAR    Situation: Pain    Background:    DX: muscle-invasive urothelial carcinoma, pending staging   Provider:   Most recent appointment: 07/24/23 w/  Upcoming appointments: 08/28/23 w/      Assessment:   Pt calling to report pain. She states her abd pain has been uncontrolled. Rates 10/10 and describes it as constant dull/cramping. She has started to take oxycodone 2 tablets every 6 hours and also taking Norco PRN without relief.     She has not made an appt with [facility] yet. She knows she needs to schedule with them to follow up.    She is wondering what she can do to help with the pain.     Recommendation:   Advise to try heat/cold to areas that are painful. Pt reports she use to do this in the past and it did offer relief.  1407 Paged   1408 Dr. Kirkland returning page to advise pt alternate oxy with Tylenol. If pain continues to be uncontrolled she should report to ED ([facility]) since something else may be causing pain.  1428 Reviewed with pt  recommendation to alternate between oxycodone and tylenol. Reviewed with pt not to exceed the daily recommended dose of tylenol. Encouraged pt to report to ED if pain continues to be uncontrolled. Pt verbalized understanding.

## 2023-07-25 NOTE — PATIENT INSTRUCTIONS
Abiel,  I'm glad to hear your pain is somewhat better after the procedure for the nephrostomy tube placement.      Please follow up with the referral for [facility].  Here is their phone number: [phone]  Please try to see them within the next two weeks to transfer your cares.  We are working on getting your images sent to them so they can view them.    I have refilled your oxycodone for another two weeks while you transition.    You can see me back by video in a month.  If you are established with an oncologist at that time you do not need to see me and can cancel.   
no

## 2023-07-28 ENCOUNTER — PATIENT OUTREACH (OUTPATIENT)
Dept: ONCOLOGY | Facility: CLINIC | Age: 68
End: 2023-07-28
Payer: COMMERCIAL

## 2023-07-28 NOTE — PROGRESS NOTES
"[facility]: Cancer Care                                                                                          Patient called regarding her dressing change with her PNT. RN reviewed her discharge notes and asked if home care came out yet. Patient said \"Yes, my nurse Julianna was out on Monday\" RN asked when she would be back? Patient stated \" I think I heard something like twice a week\"  Patient reports seeing the phone number to [facility]. RN suggested she call and let them know she has questions about her dressing changes. Patient also stated she does have an appointment set up with [facility] for oncology.     Cat BAUER, RN   Care Coordinator  [facility]   "

## 2023-07-29 ENCOUNTER — HEALTH MAINTENANCE LETTER (OUTPATIENT)
Age: 68
End: 2023-07-29

## 2023-08-04 NOTE — PROGRESS NOTES
Social Work - Intervention  [facility]  Data/Intervention:    Patient Name: Abiel Ruiz Goes By: Abiel    /Age: 1955 (68 year old)     Visit Type: telephone  Referral Source: care team  Reason for Referral: transportation    Collaborated With:    -patient  -     Psychosocial Information/Concerns:  Patient needs PNT placement and will be admitted for placement  Patient is main care giver for their sister who previously had a stroke and now lives with patient  Patient has asked another family member to come and stay with them to care for their sister while patient is receiving care  Patient does not drive and is not certain they have someone who can drive them to their procedure  Patient utilizes [employer] for transportation, this is not an appropriate transportation to use following their procedure.      Intervention/Education/Resources Provided:  Education about [facility] for future transportation needs  Reviewed SW's role and other resources available to Patient   Discussed utilization of [employer] transportation isha       Assessment/Plan:  Patient agreed [facility] will be helpful for future transportation needs when they are unable to use [employer].   Patient stated their Uncle who is coming down to care for patient's sister may be able to drive them to their procedure. Patient will plan to follow up with SW if this is available.   SW agreed to use [employer] isha if needed for upcoming transportation needs, if patient is unable to fin a ride.    Patient called back later in the day stating their Uncle would be able to provide them with a ride.     Patient agreed to follow up if they have any interest in connecting with any other resources.     No further follow up needed at this time.      Provided patient/family with contact information and availability.    Nataliya Skinner MSW, LICSW  - Oncology  Phone  : [phone]  Pager: [phone]

## 2023-09-21 ENCOUNTER — PATIENT OUTREACH (OUTPATIENT)
Dept: ONCOLOGY | Facility: CLINIC | Age: 68
End: 2023-09-21
Payer: COMMERCIAL

## 2023-09-21 NOTE — PROGRESS NOTES
[facility]: Cancer Care                                                                                          Patient had called asking who should she contact regarding her PNT. Patient has since transferred from Dr. Kirkland to [facility]. Patient stated in her message that her current oncologist is recommending she contact the provider who placed her tubes. RN called back and left a message with urology phone number    Cat BAUER, RN   Care Coordinator  [facility]

## 2023-10-13 ENCOUNTER — TELEPHONE (OUTPATIENT)
Dept: UROLOGY | Facility: CLINIC | Age: 68
End: 2023-10-13
Payer: COMMERCIAL

## 2023-10-13 NOTE — CONFIDENTIAL NOTE
[facility] for patient regarding her triage question about next steps.  It appears that the patient saw Dr. Rosales, who transferred her care to Dr. Kirkland in medical oncology in June of this year.  In August of this year, the patient requested that her care be transferred to [facility] because it was closer.  The patient has been followed by Dr. Sorin Byers at [facility].  This author's direct line provided for future questions/concerns.  Marin Marsh, RN  RN Care Coordinator - Urology

## 2023-10-17 ENCOUNTER — TELEPHONE (OUTPATIENT)
Dept: UROLOGY | Facility: CLINIC | Age: 68
End: 2023-10-17
Payer: COMMERCIAL

## 2023-10-17 NOTE — CONFIDENTIAL NOTE
LVM for patient regarding her triage message.  This author's direct line provided for future questions/concerns.    Marin Marsh, RN  RN Care Coordinator - Urology

## 2024-09-21 ENCOUNTER — HEALTH MAINTENANCE LETTER (OUTPATIENT)
Age: 69
End: 2024-09-21

## (undated) RX ORDER — LIDOCAINE HYDROCHLORIDE 10 MG/ML
INJECTION, SOLUTION EPIDURAL; INFILTRATION; INTRACAUDAL; PERINEURAL
Status: DISPENSED
Start: 2023-07-19

## (undated) RX ORDER — FENTANYL CITRATE 50 UG/ML
INJECTION, SOLUTION INTRAMUSCULAR; INTRAVENOUS
Status: DISPENSED
Start: 2023-07-19

## (undated) RX ORDER — AMPICILLIN 1 G/1
INJECTION, POWDER, FOR SOLUTION INTRAMUSCULAR; INTRAVENOUS
Status: DISPENSED
Start: 2023-07-19